# Patient Record
Sex: MALE | Race: WHITE | NOT HISPANIC OR LATINO | Employment: FULL TIME | URBAN - METROPOLITAN AREA
[De-identification: names, ages, dates, MRNs, and addresses within clinical notes are randomized per-mention and may not be internally consistent; named-entity substitution may affect disease eponyms.]

---

## 2017-05-16 ENCOUNTER — GENERIC CONVERSION - ENCOUNTER (OUTPATIENT)
Dept: OTHER | Facility: OTHER | Age: 37
End: 2017-05-16

## 2017-08-07 ENCOUNTER — GENERIC CONVERSION - ENCOUNTER (OUTPATIENT)
Dept: OTHER | Facility: OTHER | Age: 37
End: 2017-08-07

## 2017-08-16 ENCOUNTER — GENERIC CONVERSION - ENCOUNTER (OUTPATIENT)
Dept: OTHER | Facility: OTHER | Age: 37
End: 2017-08-16

## 2017-08-17 ENCOUNTER — TELEPHONE (OUTPATIENT)
Dept: INPATIENT UNIT | Facility: HOSPITAL | Age: 37
End: 2017-08-17

## 2017-08-18 ENCOUNTER — HOSPITAL ENCOUNTER (OUTPATIENT)
Dept: NON INVASIVE DIAGNOSTICS | Facility: HOSPITAL | Age: 37
Discharge: HOME | End: 2017-08-18
Attending: INTERNAL MEDICINE | Admitting: INTERNAL MEDICINE
Payer: COMMERCIAL

## 2017-08-18 VITALS
RESPIRATION RATE: 20 BRPM | OXYGEN SATURATION: 96 % | TEMPERATURE: 97.8 F | HEART RATE: 65 BPM | SYSTOLIC BLOOD PRESSURE: 144 MMHG | DIASTOLIC BLOOD PRESSURE: 75 MMHG | HEIGHT: 75 IN | WEIGHT: 245 LBS | BODY MASS INDEX: 30.46 KG/M2

## 2017-08-18 DIAGNOSIS — R42 DIZZINESS AND GIDDINESS: ICD-10-CM

## 2017-08-18 DIAGNOSIS — R55 SYNCOPE AND COLLAPSE: ICD-10-CM

## 2017-08-18 DIAGNOSIS — R00.2 PALPITATION: ICD-10-CM

## 2017-08-18 PROCEDURE — 33282 HB IMPLANT PAT-ACTIVE HT RECORD: CPT | Performed by: INTERNAL MEDICINE

## 2017-08-18 PROCEDURE — C1764 EVENT RECORDER, CARDIAC: HCPCS

## 2017-08-18 RX ORDER — CANDESARTAN 16 MG/1
16 TABLET ORAL DAILY
COMMUNITY
End: 2017-11-10

## 2017-08-18 RX ORDER — LIDOCAINE HYDROCHLORIDE AND EPINEPHRINE 10; 10 MG/ML; UG/ML
INJECTION, SOLUTION INFILTRATION; PERINEURAL CODE/TRAUMA/SEDATION MEDICATION
Status: COMPLETED | OUTPATIENT
Start: 2017-08-18 | End: 2017-08-18

## 2017-08-18 RX ADMIN — LIDOCAINE HYDROCHLORIDE AND EPINEPHRINE 10 ML: 10; 10 INJECTION, SOLUTION INFILTRATION; PERINEURAL at 12:41

## 2017-11-09 ENCOUNTER — ALLSCRIPTS OFFICE VISIT (OUTPATIENT)
Dept: OTHER | Facility: OTHER | Age: 37
End: 2017-11-09

## 2017-11-09 ENCOUNTER — GENERIC CONVERSION - ENCOUNTER (OUTPATIENT)
Dept: OTHER | Facility: OTHER | Age: 37
End: 2017-11-09

## 2017-11-10 ENCOUNTER — APPOINTMENT (EMERGENCY)
Dept: RADIOLOGY | Facility: HOSPITAL | Age: 37
End: 2017-11-10
Payer: COMMERCIAL

## 2017-11-10 ENCOUNTER — GENERIC CONVERSION - ENCOUNTER (OUTPATIENT)
Dept: OTHER | Facility: OTHER | Age: 37
End: 2017-11-10

## 2017-11-10 ENCOUNTER — HOSPITAL ENCOUNTER (EMERGENCY)
Facility: HOSPITAL | Age: 37
Discharge: HOME | End: 2017-11-10
Attending: EMERGENCY MEDICINE
Payer: COMMERCIAL

## 2017-11-10 VITALS
HEIGHT: 75 IN | HEART RATE: 67 BPM | BODY MASS INDEX: 31.58 KG/M2 | DIASTOLIC BLOOD PRESSURE: 69 MMHG | OXYGEN SATURATION: 97 % | RESPIRATION RATE: 18 BRPM | WEIGHT: 254 LBS | SYSTOLIC BLOOD PRESSURE: 158 MMHG | TEMPERATURE: 96 F

## 2017-11-10 DIAGNOSIS — R00.2 PALPITATIONS: ICD-10-CM

## 2017-11-10 DIAGNOSIS — R07.9 CHEST PAIN: Primary | ICD-10-CM

## 2017-11-10 LAB
ALBUMIN SERPL BCP-MCNC: 3.9 G/DL (ref 3.5–5)
ALP SERPL-CCNC: 80 U/L (ref 46–116)
ALT SERPL W P-5'-P-CCNC: 52 U/L (ref 12–78)
ANION GAP SERPL CALCULATED.3IONS-SCNC: 6 MMOL/L (ref 4–13)
APTT PPP: 24 SECONDS (ref 23–35)
AST SERPL W P-5'-P-CCNC: 35 U/L (ref 5–45)
ATRIAL RATE: 68 BPM
ATRIAL RATE: 72 BPM
BASOPHILS # BLD AUTO: 0.04 THOUSANDS/ΜL (ref 0–0.1)
BASOPHILS NFR BLD AUTO: 1 % (ref 0–1)
BILIRUB SERPL-MCNC: 0.58 MG/DL (ref 0.2–1)
BUN SERPL-MCNC: 10 MG/DL (ref 5–25)
CALCIUM SERPL-MCNC: 8.7 MG/DL (ref 8.3–10.1)
CHLORIDE SERPL-SCNC: 103 MMOL/L (ref 100–108)
CO2 SERPL-SCNC: 27 MMOL/L (ref 21–32)
CREAT SERPL-MCNC: 1.31 MG/DL (ref 0.6–1.3)
EOSINOPHIL # BLD AUTO: 0.16 THOUSAND/ΜL (ref 0–0.61)
EOSINOPHIL NFR BLD AUTO: 2 % (ref 0–6)
ERYTHROCYTE [DISTWIDTH] IN BLOOD BY AUTOMATED COUNT: 12.4 % (ref 11.6–15.1)
GFR SERPL CREATININE-BSD FRML MDRD: 69 ML/MIN/1.73SQ M
GLUCOSE SERPL-MCNC: 97 MG/DL (ref 65–140)
HCT VFR BLD AUTO: 53.5 % (ref 36.5–49.3)
HGB BLD-MCNC: 19.1 G/DL (ref 12–17)
HOLD SPECIMEN: NORMAL
INR PPP: 0.96 (ref 0.86–1.16)
LYMPHOCYTES # BLD AUTO: 1.99 THOUSANDS/ΜL (ref 0.6–4.47)
LYMPHOCYTES NFR BLD AUTO: 26 % (ref 14–44)
MCH RBC QN AUTO: 33.6 PG (ref 26.8–34.3)
MCHC RBC AUTO-ENTMCNC: 35.7 G/DL (ref 31.4–37.4)
MCV RBC AUTO: 94 FL (ref 82–98)
MONOCYTES # BLD AUTO: 0.54 THOUSAND/ΜL (ref 0.17–1.22)
MONOCYTES NFR BLD AUTO: 7 % (ref 4–12)
NEUTROPHILS # BLD AUTO: 4.75 THOUSANDS/ΜL (ref 1.85–7.62)
NEUTS SEG NFR BLD AUTO: 64 % (ref 43–75)
NRBC BLD AUTO-RTO: 0 /100 WBCS
P AXIS: 63 DEGREES
P AXIS: 63 DEGREES
PLATELET # BLD AUTO: 286 THOUSANDS/UL (ref 149–390)
PMV BLD AUTO: 9.8 FL (ref 8.9–12.7)
POTASSIUM SERPL-SCNC: 4 MMOL/L (ref 3.5–5.3)
PR INTERVAL: 134 MS
PR INTERVAL: 148 MS
PROT SERPL-MCNC: 7.1 G/DL (ref 6.4–8.2)
PROTHROMBIN TIME: 12.8 SECONDS (ref 12.1–14.4)
QRS AXIS: 75 DEGREES
QRS AXIS: 90 DEGREES
QRSD INTERVAL: 102 MS
QRSD INTERVAL: 98 MS
QT INTERVAL: 362 MS
QT INTERVAL: 372 MS
QTC INTERVAL: 384 MS
QTC INTERVAL: 407 MS
RBC # BLD AUTO: 5.69 MILLION/UL (ref 3.88–5.62)
SODIUM SERPL-SCNC: 136 MMOL/L (ref 136–145)
SPECIMEN SOURCE: NORMAL
SPECIMEN SOURCE: NORMAL
T WAVE AXIS: 51 DEGREES
T WAVE AXIS: 57 DEGREES
TROPONIN I BLD-MCNC: 0 NG/ML (ref 0–0.08)
TROPONIN I BLD-MCNC: 0 NG/ML (ref 0–0.08)
VENTRICULAR RATE: 68 BPM
VENTRICULAR RATE: 72 BPM
WBC # BLD AUTO: 7.56 THOUSAND/UL (ref 4.31–10.16)

## 2017-11-10 PROCEDURE — 85025 COMPLETE CBC W/AUTO DIFF WBC: CPT | Performed by: EMERGENCY MEDICINE

## 2017-11-10 PROCEDURE — 93005 ELECTROCARDIOGRAM TRACING: CPT | Performed by: EMERGENCY MEDICINE

## 2017-11-10 PROCEDURE — 85610 PROTHROMBIN TIME: CPT | Performed by: EMERGENCY MEDICINE

## 2017-11-10 PROCEDURE — 99285 EMERGENCY DEPT VISIT HI MDM: CPT

## 2017-11-10 PROCEDURE — 80053 COMPREHEN METABOLIC PANEL: CPT | Performed by: EMERGENCY MEDICINE

## 2017-11-10 PROCEDURE — 96361 HYDRATE IV INFUSION ADD-ON: CPT

## 2017-11-10 PROCEDURE — 84484 ASSAY OF TROPONIN QUANT: CPT

## 2017-11-10 PROCEDURE — 71020 HB CHEST X-RAY 2VW FRONTAL&LATL: CPT

## 2017-11-10 PROCEDURE — 36415 COLL VENOUS BLD VENIPUNCTURE: CPT

## 2017-11-10 PROCEDURE — 85730 THROMBOPLASTIN TIME PARTIAL: CPT | Performed by: EMERGENCY MEDICINE

## 2017-11-10 PROCEDURE — 96374 THER/PROPH/DIAG INJ IV PUSH: CPT

## 2017-11-10 RX ORDER — KETOROLAC TROMETHAMINE 30 MG/ML
15 INJECTION, SOLUTION INTRAMUSCULAR; INTRAVENOUS ONCE
Status: COMPLETED | OUTPATIENT
Start: 2017-11-10 | End: 2017-11-10

## 2017-11-10 RX ORDER — CANDESARTAN 8 MG/1
8 TABLET ORAL DAILY
COMMUNITY
End: 2020-08-03 | Stop reason: ALTCHOICE

## 2017-11-10 RX ADMIN — SODIUM CHLORIDE 1000 ML: 0.9 INJECTION, SOLUTION INTRAVENOUS at 15:15

## 2017-11-10 RX ADMIN — KETOROLAC TROMETHAMINE 15 MG: 30 INJECTION, SOLUTION INTRAMUSCULAR at 12:39

## 2017-11-10 NOTE — DISCHARGE INSTRUCTIONS
Follow up with pcp in 1-3 days  If you begin to have fevers, worsening of symptoms, return to the ED immediately  Chest Pain, Ambulatory Care   GENERAL INFORMATION:   Chest pain  can be caused by a range of conditions, from not serious to life-threatening  It may be caused by a heart attack or a blood clot in your lungs  Sometimes chest pain or pressure is caused by poor blood flow to your heart (angina)  Infection, inflammation, or a fracture in the bones or cartilage in your chest can cause pain or discomfort  Chest pain can also be a symptom of a digestive problem, such as acid reflux or a stomach ulcer  Common symptoms include the following:   · Fever or sweating     · Nausea or vomiting     · Shortness of breath     · Discomfort or pressure that spreads from your chest to your back, jaw, or arm     · A racing or slow heartbeat     · Feeling weak, tired, or faint  Seek immediate care for the following symptoms:   · Any of the following signs of a heart attack:      ¨ Squeezing, pressure, or pain in your chest that lasts longer than 5 minutes or returns    ¨ Discomfort or pain in your back, neck, jaw, stomach, or arm     ¨ Trouble breathing     ¨ Nausea or vomiting    ¨ Lightheadedness or a sudden cold sweat, especially with trouble breathing         · Chest discomfort that gets worse, even with medicine    · Coughing or vomiting blood    · Black or bloody bowel movements     · Vomiting that does not stop, or pain when you swallow  Treatment for chest pain  may include medicine to treat your symptoms while he determines the cause of your chest pain  You may also need any of the following:  · Antiplatelets , such as aspirin, help prevent blood clots  Take your antiplatelet medicine exactly as directed  These medicines make it more likely for you to bleed or bruise  If you are told to take aspirin, do not take acetaminophen or ibuprofen instead  · Prescription pain medicine  may be given   Ask how to take this medicine safely  Do not smoke: If you smoke, it is never too late to quit  Smoking increases your risk for a heart attack and other heart and lung conditions  Ask your healthcare provider for information about how to stop smoking if you need help  Follow up with your healthcare provider as directed: You may need more tests  You may be referred to a specialist, such as a cardiologist or gastroenterologist  Write down your questions so you remember to ask them during your visits  CARE AGREEMENT:   You have the right to help plan your care  Learn about your health condition and how it may be treated  Discuss treatment options with your caregivers to decide what care you want to receive  You always have the right to refuse treatment  The above information is an  only  It is not intended as medical advice for individual conditions or treatments  Talk to your doctor, nurse or pharmacist before following any medical regimen to see if it is safe and effective for you  © 2014 [address] is for End User's use only and may not be sold, redistributed or otherwise used for commercial purposes  All illustrations and images included in CareNotes® are the copyrighted property of A D A M , Inc  or [employer] Olvera

## 2017-11-10 NOTE — ED PROVIDER NOTES
History  Chief Complaint   Patient presents with    Chest Pain     Pt c/o chest pain with hx  of SVT  Pt states never had CP before with the SVT, states this is different     40year old male with hx of paroxsymal SVT comes to the ED for evaluation of chest pain that started at around 11:15am  Patient states that he was outside finished arlet, and had palpitations with associated chest pain that radiated to his left arm for a couple of minutes  Patient states he's never had chest pain with the palpitations which is why he came to the hospital  Of note, patient had a negative cath in 2016  Patient also seen by his cardiologist yesterday with plan for ablation on 11/30  Patient states that his chest tightness is better now  Otherwise, patient denies fever, nausea, vomiting, abdominal pain, dysuria, constipation, diarrhea  MDM: I will order troponin, ekg, chest xray, to evaluate for cardiopulmonary pathology  I will give patient toradol as chest pain is tender to palpation so possibly musculalskeltal complonenet to chest pain vs coronary ischemia  Prior to Admission Medications   Prescriptions Last Dose Informant Patient Reported? Taking? candesartan (ATACAND) 8 MG tablet 11/10/2017 at Unknown time  Yes Yes   Sig: Take by mouth      Facility-Administered Medications: None       Past Medical History:   Diagnosis Date    SVT (supraventricular tachycardia) ([facility] [address] )        Past Surgical History:   Procedure Laterality Date    BACK SURGERY      KNEE SURGERY         History reviewed  No pertinent family history  I have reviewed and agree with the history as documented  Social History   Substance Use Topics    Smoking status: Current Every Day Smoker    Smokeless tobacco: Current User     Types: Chew    Alcohol use No        Review of Systems   Constitutional: Negative for appetite change, chills, diaphoresis, fatigue and fever     HENT: Negative for congestion, ear discharge, ear pain, hearing loss, postnasal drip, rhinorrhea, sneezing and sore throat  Eyes: Negative for pain, discharge and redness  Respiratory: Positive for chest tightness and shortness of breath  Negative for cough, choking, wheezing and stridor  Cardiovascular: Positive for chest pain  Negative for palpitations  Gastrointestinal: Negative for abdominal distention, abdominal pain, blood in stool, constipation, diarrhea, nausea and vomiting  Genitourinary: Negative for decreased urine volume, difficulty urinating, dysuria, flank pain, frequency and hematuria  Musculoskeletal: Negative for arthralgias, gait problem, joint swelling and neck pain  Skin: Negative for color change, pallor and rash  Allergic/Immunologic: Negative for environmental allergies, food allergies and immunocompromised state  Neurological: Positive for light-headedness  Negative for dizziness, seizures, weakness, numbness and headaches  Hematological: Negative for adenopathy  Does not bruise/bleed easily  Psychiatric/Behavioral: Negative for agitation and behavioral problems  Physical Exam  ED Triage Vitals   Temperature Pulse Respirations Blood Pressure SpO2   11/10/17 1152 11/10/17 1152 11/10/17 1152 11/10/17 1203 11/10/17 1203   (!) 96 °F (35 6 °C) 86 20 156/84 95 %      Temp Source Heart Rate Source Patient Position - Orthostatic VS BP Location FiO2 (%)   11/10/17 1152 11/10/17 1152 11/10/17 1152 11/10/17 1152 --   Tympanic Monitor Lying Right arm       Pain Score       11/10/17 1152       4           Orthostatic Vital Signs  Vitals:    11/10/17 1340 11/10/17 1409 11/10/17 1513 11/10/17 1622   BP: 157/77 163/74 162/65 158/69   Pulse: 71 73 77 67   Patient Position - Orthostatic VS: Lying Lying Sitting Sitting       Physical Exam   Constitutional: He is oriented to person, place, and time  He appears well-developed and well-nourished  HENT:   Head: Normocephalic and atraumatic     Nose: Nose normal    Mouth/Throat: Oropharynx is clear and moist    Eyes: Conjunctivae and EOM are normal  Pupils are equal, round, and reactive to light  Neck: Normal range of motion  Neck supple  Cardiovascular: Normal rate, regular rhythm and normal heart sounds  Exam reveals no gallop and no friction rub  No murmur heard  Pulmonary/Chest: Effort normal and breath sounds normal  No respiratory distress  He has no wheezes  He has no rales  Abdominal: Soft  Bowel sounds are normal  He exhibits no distension  There is no tenderness  There is no rebound and no guarding  Musculoskeletal: Normal range of motion  Neurological: He is alert and oriented to person, place, and time  Skin: Skin is warm and dry  Psychiatric: He has a normal mood and affect  His behavior is normal    Nursing note and vitals reviewed  ED Medications  Medications   ketorolac (TORADOL) 30 mg/mL injection 15 mg (15 mg Intravenous Given 11/10/17 1239)   sodium chloride 0 9 % bolus 1,000 mL (0 mL Intravenous Stopped 11/10/17 1621)       Diagnostic Studies  Results Reviewed     Procedure Component Value Units Date/Time    POCT troponin [[id number]]  (Normal) Collected:  11/10/17 1519    Lab Status:  Final result Updated:  11/10/17 1532     POC Troponin I 0 00 ng/ml      Specimen Type VENOUS    Narrative:         [employer] i-Stat handheld analyzer 99% cutoff is > 0 08ng/mL in [facility] Emergency Departments    o cTnI 99% cutoff is useful only when applied to patients in the clinical setting of myocardial ischemia  o cTnI 99% cutoff should be interpreted in the context of clinical history, ECG findings and possibly cardiac imaging to establish correct diagnosis  o cTnI 99% cutoff may be suggestive but clearly not indicative of a coronary event without the clinical setting of myocardial ischemia      Comprehensive metabolic panel [[id number]]  (Abnormal) Collected:  11/10/17 1200    Lab Status:  Final result Specimen:  Blood from Arm, Right Updated:  11/10/17 1234     Sodium 136 mmol/L Potassium 4 0 mmol/L      Chloride 103 mmol/L      CO2 27 mmol/L      Anion Gap 6 mmol/L      BUN 10 mg/dL      Creatinine 1 31 (H) mg/dL      Glucose 97 mg/dL      Calcium 8 7 mg/dL      AST 35 U/L      ALT 52 U/L      Alkaline Phosphatase 80 U/L      Total Protein 7 1 g/dL      Albumin 3 9 g/dL      Total Bilirubin 0 58 mg/dL      eGFR 69 ml/min/1 73sq m     Narrative:         National Kidney Disease Education Program recommendations are as follows:  GFR calculation is accurate only with a steady state creatinine  Chronic Kidney disease less than 60 ml/min/1 73 sq  meters  Kidney failure less than 15 ml/min/1 73 sq  meters  Prema Diamond [[id number]]  (Normal) Collected:  11/10/17 1200    Lab Status:  Final result Specimen:  Blood from Arm, Right Updated:  11/10/17 1219     Protime 12 8 seconds      INR 0 96    APTT [[id number]]  (Normal) Collected:  11/10/17 1200    Lab Status:  Final result Specimen:  Blood from Arm, Right Updated:  11/10/17 1219     PTT 24 seconds     Narrative: Therapeutic Heparin Range = 60-90 seconds    POCT troponin [[id number]]  (Normal) Collected:  11/10/17 1204    Lab Status:  Final result Updated:  11/10/17 1217     POC Troponin I 0 00 ng/ml      Specimen Type VENOUS    Narrative:         [employer] i-Stat handheld analyzer 99% cutoff is > 0 08ng/mL in [facility] Emergency Departments    o cTnI 99% cutoff is useful only when applied to patients in the clinical setting of myocardial ischemia  o cTnI 99% cutoff should be interpreted in the context of clinical history, ECG findings and possibly cardiac imaging to establish correct diagnosis  o cTnI 99% cutoff may be suggestive but clearly not indicative of a coronary event without the clinical setting of myocardial ischemia      CBC and differential [[id number]]  (Abnormal) Collected:  11/10/17 1200    Lab Status:  Final result Specimen:  Blood from Arm, Right Updated:  11/10/17 1210     WBC 7 56 Thousand/uL      RBC 5 69 (H) Million/uL Hemoglobin 19 1 (H) g/dL      Hematocrit 53 5 (H) %      MCV 94 fL      MCH 33 6 pg      MCHC 35 7 g/dL      RDW 12 4 %      MPV 9 8 fL      Platelets 732 Thousands/uL      nRBC 0 /100 WBCs      Neutrophils Relative 64 %      Lymphocytes Relative 26 %      Monocytes Relative 7 %      Eosinophils Relative 2 %      Basophils Relative 1 %      Neutrophils Absolute 4 75 Thousands/µL      Lymphocytes Absolute 1 99 Thousands/µL      Monocytes Absolute 0 54 Thousand/µL      Eosinophils Absolute 0 16 Thousand/µL      Basophils Absolute 0 04 Thousands/µL                  X-ray chest 2 views   Final Result by Sourav Peng DO (11/10 1345)      No active pulmonary disease           Workstation performed: [id number]               Procedures  Procedures      Phone Consults  ED Phone Contact    ED Course  ED Course as of Nov 10 1829   Gabrielhilda Rubin Nov 10, 2017   1357 Patient states that his chest pain feels moderately better          HEART Risk Score    Flowsheet Row Most Recent Value   History  1 Filed at: 11/10/2017 1215   ECG  0 Filed at: 11/10/2017 1215   Age  0 Filed at: 11/10/2017 1215   Risk Factors  1 Filed at: 11/10/2017 1215   Troponin  0 Filed at: 11/10/2017 1215   Heart Score Risk Calculator   History  1 Filed at: 11/10/2017 1215   ECG  0 Filed at: 11/10/2017 1215   Age  0 Filed at: 11/10/2017 1215   Risk Factors  1 Filed at: 11/10/2017 1215   Troponin  0 Filed at: 11/10/2017 1215   HEART Score  2 Filed at: 11/10/2017 1215   HEART Score  2 Filed at: 11/10/2017 1215            [address] for PE    Flowsheet Row Most Recent Value   PERC Rule for PE   Age >=50  0 Filed at: 11/10/2017 1216   HR >=100  No data   O2 Sat on room air < 95%  0 Filed at: 11/10/2017 1216   History of PE or DVT  0 Filed at: 11/10/2017 1216   Recent trauma or surgery  0 Filed at: 11/10/2017 1216   Hemoptysis  0 Filed at: 11/10/2017 1216   Exogenous estrogen  0 Filed at: 11/10/2017 1216   Unilateral leg swelling  0 Filed at: 11/10/2017 1216   [address] for PE Results  0 Filed at: 11/10/2017 1216                      MDM  CritCare Time    Disposition  Final diagnoses:   Chest pain   Palpitations     Time reflects when diagnosis was documented in both MDM as applicable and the Disposition within this note     Time User Action Codes Description Comment    11/10/2017  4:17 PM Drenda Beverage Add [R07 9] Chest pain     11/10/2017  4:18 PM Drenda Beverage Add [R00 2] Palpitations       ED Disposition     ED Disposition Condition Comment    Discharge  Prabhu Heard discharge to home/self care  Condition at discharge: Stable        Follow-up Information     Follow up With Specialties Details Why Mortimer Ra, MD  In 3 days  [address] [address]   [phone]          Discharge Medication List as of 11/10/2017  4:20 PM      CONTINUE these medications which have NOT CHANGED    Details   candesartan (ATACAND) 8 MG tablet Take by mouth, Historical Med           No discharge procedures on file  ED Provider  Attending physically available and evaluated Prabhu Heard I managed the patient along with the ED Attending      Electronically Signed by         Luz Mcintyre MD  Resident  11/10/17 9877

## 2017-11-10 NOTE — ED ATTENDING ATTESTATION
Farida Rivera MD, saw and evaluated the patient  I have discussed the patient with the resident/non-physician practitioner and agree with the resident's/non-physician practitioner's findings, Plan of Care, and MDM as documented in the resident's/non-physician practitioner's note, except where noted  All available labs and Radiology studies were reviewed  At this point I agree with the current assessment done in the Emergency Department  I have conducted an independent evaluation of this patient a history and physical is as follows:    CP for 1 hour   Tight    Has SVT history  Has loop recorder  Mild sob no n/v or sweats   No sob now   Had   palpitations   Now symptoms are better       Pt is to have ablation    At the end of November      Cardiac risk factors hypertension,  nonsmoker no family history of early cardiac disease   No hyperlipidemia no cocaine or amphetamine use denies steroid use    The patient had has had a cardiac catheterization in August of 2016 which demonstrated normal coronary arteries   Exam NAD   Neck no jvd  Lungs clear heart rrr  No m  Chest wall mild tenderness noted abd soft nt nd  Pos bs ext normal     Imp CP     EKG normal sinus rhythm no acute ischemic changes   initial troponin 0 00   chest x-ray negative   hemoglobin 19   however he has had elevated hemoglobins on prior CBC   will attempt to have loop recorder interrogated   will perform a a 3 hour troponin and EKG   discussed plan with patient  Critical Care Time  CritCare Time

## 2017-11-29 ENCOUNTER — TELEPHONE (OUTPATIENT)
Dept: INPATIENT UNIT | Facility: HOSPITAL | Age: 37
End: 2017-11-29

## 2017-11-29 DIAGNOSIS — I82.220 ACUTE EMBOLISM AND THROMBOSIS OF INFERIOR VENA CAVA (HCC): ICD-10-CM

## 2017-11-30 ENCOUNTER — HOSPITAL ENCOUNTER (OUTPATIENT)
Dept: NON INVASIVE DIAGNOSTICS | Facility: HOSPITAL | Age: 37
Discharge: HOME | End: 2017-11-30
Attending: INTERNAL MEDICINE | Admitting: INTERNAL MEDICINE
Payer: COMMERCIAL

## 2017-11-30 ENCOUNTER — GENERIC CONVERSION - ENCOUNTER (OUTPATIENT)
Dept: OTHER | Facility: OTHER | Age: 37
End: 2017-11-30

## 2017-11-30 ENCOUNTER — ANESTHESIA EVENT (OUTPATIENT)
Dept: SURGERY | Facility: HOSPITAL | Age: 37
End: 2017-11-30
Payer: COMMERCIAL

## 2017-11-30 ENCOUNTER — ANESTHESIA (OUTPATIENT)
Dept: SURGERY | Facility: HOSPITAL | Age: 37
End: 2017-11-30
Payer: COMMERCIAL

## 2017-11-30 VITALS
TEMPERATURE: 97.4 F | RESPIRATION RATE: 20 BRPM | HEIGHT: 77 IN | DIASTOLIC BLOOD PRESSURE: 57 MMHG | SYSTOLIC BLOOD PRESSURE: 134 MMHG | BODY MASS INDEX: 29.87 KG/M2 | WEIGHT: 253 LBS | OXYGEN SATURATION: 98 % | HEART RATE: 68 BPM

## 2017-11-30 DIAGNOSIS — I47.1 PAROXYSMAL SUPRAVENTRICULAR TACHYCARDIA (HCC): ICD-10-CM

## 2017-11-30 LAB
ANION GAP SERPL CALCULATED.3IONS-SCNC: 7 MMOL/L (ref 4–13)
BASOPHILS # BLD AUTO: 0.02 THOUSANDS/ΜL (ref 0–0.1)
BASOPHILS NFR BLD AUTO: 0 % (ref 0–1)
BUN SERPL-MCNC: 14 MG/DL (ref 5–25)
CALCIUM SERPL-MCNC: 8.6 MG/DL (ref 8.3–10.1)
CHLORIDE SERPL-SCNC: 104 MMOL/L (ref 100–108)
CO2 SERPL-SCNC: 31 MMOL/L (ref 21–32)
CREAT SERPL-MCNC: 1.28 MG/DL (ref 0.6–1.3)
EOSINOPHIL # BLD AUTO: 0.05 THOUSAND/ΜL (ref 0–0.61)
EOSINOPHIL NFR BLD AUTO: 1 % (ref 0–6)
ERYTHROCYTE [DISTWIDTH] IN BLOOD BY AUTOMATED COUNT: 12.2 % (ref 11.6–15.1)
GFR SERPL CREATININE-BSD FRML MDRD: 71 ML/MIN/1.73SQ M
GLUCOSE P FAST SERPL-MCNC: 88 MG/DL (ref 65–99)
GLUCOSE SERPL-MCNC: 88 MG/DL (ref 65–140)
HCT VFR BLD AUTO: 51.6 % (ref 36.5–49.3)
HGB BLD-MCNC: 17.8 G/DL (ref 12–17)
LYMPHOCYTES # BLD AUTO: 1.07 THOUSANDS/ΜL (ref 0.6–4.47)
LYMPHOCYTES NFR BLD AUTO: 16 % (ref 14–44)
MAGNESIUM SERPL-MCNC: 2.5 MG/DL (ref 1.6–2.6)
MCH RBC QN AUTO: 32.4 PG (ref 26.8–34.3)
MCHC RBC AUTO-ENTMCNC: 34.5 G/DL (ref 31.4–37.4)
MCV RBC AUTO: 94 FL (ref 82–98)
MONOCYTES # BLD AUTO: 0.64 THOUSAND/ΜL (ref 0.17–1.22)
MONOCYTES NFR BLD AUTO: 10 % (ref 4–12)
NEUTROPHILS # BLD AUTO: 4.89 THOUSANDS/ΜL (ref 1.85–7.62)
NEUTS SEG NFR BLD AUTO: 73 % (ref 43–75)
NRBC BLD AUTO-RTO: 0 /100 WBCS
PLATELET # BLD AUTO: 276 THOUSANDS/UL (ref 149–390)
PMV BLD AUTO: 9.9 FL (ref 8.9–12.7)
POTASSIUM SERPL-SCNC: 4.3 MMOL/L (ref 3.5–5.3)
RBC # BLD AUTO: 5.49 MILLION/UL (ref 3.88–5.62)
SODIUM SERPL-SCNC: 142 MMOL/L (ref 136–145)
WBC # BLD AUTO: 6.71 THOUSAND/UL (ref 4.31–10.16)

## 2017-11-30 PROCEDURE — 83735 ASSAY OF MAGNESIUM: CPT | Performed by: PHYSICIAN ASSISTANT

## 2017-11-30 PROCEDURE — C1894 INTRO/SHEATH, NON-LASER: HCPCS | Performed by: INTERNAL MEDICINE

## 2017-11-30 PROCEDURE — 85025 COMPLETE CBC W/AUTO DIFF WBC: CPT | Performed by: PHYSICIAN ASSISTANT

## 2017-11-30 PROCEDURE — 93621 COMP EP EVL L PAC&REC C SINS: CPT | Performed by: INTERNAL MEDICINE

## 2017-11-30 PROCEDURE — 93623 PRGRMD STIMJ&PACG IV RX NFS: CPT | Performed by: INTERNAL MEDICINE

## 2017-11-30 PROCEDURE — C1730 CATH, EP, 19 OR FEW ELECT: HCPCS | Performed by: INTERNAL MEDICINE

## 2017-11-30 PROCEDURE — 80048 BASIC METABOLIC PNL TOTAL CA: CPT | Performed by: PHYSICIAN ASSISTANT

## 2017-11-30 PROCEDURE — C1733 CATH, EP, OTHR THAN COOL-TIP: HCPCS | Performed by: INTERNAL MEDICINE

## 2017-11-30 PROCEDURE — 93653 COMPRE EP EVAL TX SVT: CPT | Performed by: INTERNAL MEDICINE

## 2017-11-30 PROCEDURE — C1893 INTRO/SHEATH, FIXED,NON-PEEL: HCPCS | Performed by: INTERNAL MEDICINE

## 2017-11-30 PROCEDURE — 93613 INTRACARDIAC EPHYS 3D MAPG: CPT | Performed by: INTERNAL MEDICINE

## 2017-11-30 RX ORDER — ONDANSETRON 2 MG/ML
4 INJECTION INTRAMUSCULAR; INTRAVENOUS ONCE AS NEEDED
Status: CANCELLED | OUTPATIENT
Start: 2017-11-30

## 2017-11-30 RX ORDER — FENTANYL CITRATE 50 UG/ML
INJECTION, SOLUTION INTRAMUSCULAR; INTRAVENOUS AS NEEDED
Status: DISCONTINUED | OUTPATIENT
Start: 2017-11-30 | End: 2017-11-30 | Stop reason: SURG

## 2017-11-30 RX ORDER — MEPERIDINE HYDROCHLORIDE 25 MG/ML
12.5 INJECTION INTRAMUSCULAR; INTRAVENOUS; SUBCUTANEOUS AS NEEDED
Status: CANCELLED | OUTPATIENT
Start: 2017-11-30

## 2017-11-30 RX ORDER — MIDAZOLAM HYDROCHLORIDE 1 MG/ML
INJECTION INTRAMUSCULAR; INTRAVENOUS AS NEEDED
Status: DISCONTINUED | OUTPATIENT
Start: 2017-11-30 | End: 2017-11-30 | Stop reason: SURG

## 2017-11-30 RX ORDER — PROPOFOL 10 MG/ML
INJECTION, EMULSION INTRAVENOUS CONTINUOUS PRN
Status: DISCONTINUED | OUTPATIENT
Start: 2017-11-30 | End: 2017-11-30 | Stop reason: SURG

## 2017-11-30 RX ORDER — ACETAMINOPHEN 325 MG/1
650 TABLET ORAL EVERY 4 HOURS PRN
Status: DISCONTINUED | OUTPATIENT
Start: 2017-11-30 | End: 2017-11-30 | Stop reason: HOSPADM

## 2017-11-30 RX ORDER — CEFAZOLIN SODIUM 1 G/3ML
INJECTION, POWDER, FOR SOLUTION INTRAMUSCULAR; INTRAVENOUS AS NEEDED
Status: DISCONTINUED | OUTPATIENT
Start: 2017-11-30 | End: 2017-11-30 | Stop reason: SURG

## 2017-11-30 RX ORDER — SODIUM CHLORIDE 9 MG/ML
INJECTION, SOLUTION INTRAVENOUS CONTINUOUS PRN
Status: DISCONTINUED | OUTPATIENT
Start: 2017-11-30 | End: 2017-11-30 | Stop reason: SURG

## 2017-11-30 RX ORDER — ADENOSINE 3 MG/ML
INJECTION INTRAVENOUS CODE/TRAUMA/SEDATION MEDICATION
Status: COMPLETED | OUTPATIENT
Start: 2017-11-30 | End: 2017-11-30

## 2017-11-30 RX ORDER — KETOROLAC TROMETHAMINE 30 MG/ML
INJECTION, SOLUTION INTRAMUSCULAR; INTRAVENOUS AS NEEDED
Status: DISCONTINUED | OUTPATIENT
Start: 2017-11-30 | End: 2017-11-30 | Stop reason: SURG

## 2017-11-30 RX ORDER — LIDOCAINE HYDROCHLORIDE 10 MG/ML
INJECTION, SOLUTION INFILTRATION; PERINEURAL CODE/TRAUMA/SEDATION MEDICATION
Status: COMPLETED | OUTPATIENT
Start: 2017-11-30 | End: 2017-11-30

## 2017-11-30 RX ORDER — ASPIRIN 81 MG/1
81 TABLET ORAL DAILY
Refills: 0
Start: 2017-11-30 | End: 2018-01-09

## 2017-11-30 RX ORDER — ALBUTEROL SULFATE 2.5 MG/3ML
2.5 SOLUTION RESPIRATORY (INHALATION) ONCE AS NEEDED
Status: CANCELLED | OUTPATIENT
Start: 2017-11-30

## 2017-11-30 RX ORDER — ONDANSETRON 2 MG/ML
INJECTION INTRAMUSCULAR; INTRAVENOUS AS NEEDED
Status: DISCONTINUED | OUTPATIENT
Start: 2017-11-30 | End: 2017-11-30 | Stop reason: SURG

## 2017-11-30 RX ORDER — PROPOFOL 10 MG/ML
INJECTION, EMULSION INTRAVENOUS AS NEEDED
Status: DISCONTINUED | OUTPATIENT
Start: 2017-11-30 | End: 2017-11-30 | Stop reason: SURG

## 2017-11-30 RX ORDER — HYDRALAZINE HYDROCHLORIDE 20 MG/ML
INJECTION INTRAMUSCULAR; INTRAVENOUS AS NEEDED
Status: DISCONTINUED | OUTPATIENT
Start: 2017-11-30 | End: 2017-11-30 | Stop reason: SURG

## 2017-11-30 RX ADMIN — HYDRALAZINE HYDROCHLORIDE 10 MG: 20 INJECTION INTRAMUSCULAR; INTRAVENOUS at 12:07

## 2017-11-30 RX ADMIN — CEFAZOLIN 2000 MG: 1 INJECTION, POWDER, FOR SOLUTION INTRAVENOUS at 08:30

## 2017-11-30 RX ADMIN — PROPOFOL 50 MG: 10 INJECTION, EMULSION INTRAVENOUS at 10:27

## 2017-11-30 RX ADMIN — LIDOCAINE HYDROCHLORIDE 5 ML: 10 INJECTION, SOLUTION INFILTRATION; PERINEURAL at 08:40

## 2017-11-30 RX ADMIN — SODIUM CHLORIDE: 0.9 INJECTION, SOLUTION INTRAVENOUS at 08:01

## 2017-11-30 RX ADMIN — PROPOFOL 100 MCG/KG/MIN: 10 INJECTION, EMULSION INTRAVENOUS at 08:07

## 2017-11-30 RX ADMIN — HYDRALAZINE HYDROCHLORIDE 10 MG: 20 INJECTION INTRAMUSCULAR; INTRAVENOUS at 11:51

## 2017-11-30 RX ADMIN — PROPOFOL 50 MG: 10 INJECTION, EMULSION INTRAVENOUS at 08:12

## 2017-11-30 RX ADMIN — PROPOFOL 50 MG: 10 INJECTION, EMULSION INTRAVENOUS at 09:49

## 2017-11-30 RX ADMIN — REMIFENTANIL HYDROCHLORIDE 0.1 MCG/KG/MIN: 1 INJECTION, POWDER, LYOPHILIZED, FOR SOLUTION INTRAVENOUS at 08:07

## 2017-11-30 RX ADMIN — FENTANYL CITRATE 100 MCG: 50 INJECTION, SOLUTION INTRAMUSCULAR; INTRAVENOUS at 12:59

## 2017-11-30 RX ADMIN — ADENOSINE 18 MG: 3 INJECTION, SOLUTION INTRAVENOUS at 09:20

## 2017-11-30 RX ADMIN — KETOROLAC TROMETHAMINE 30 MG: 30 INJECTION, SOLUTION INTRAMUSCULAR at 11:48

## 2017-11-30 RX ADMIN — MIDAZOLAM HYDROCHLORIDE 2 MG: 1 INJECTION, SOLUTION INTRAMUSCULAR; INTRAVENOUS at 08:02

## 2017-11-30 RX ADMIN — ADENOSINE 24 MG: 3 INJECTION, SOLUTION INTRAVENOUS at 09:23

## 2017-11-30 RX ADMIN — ISOPROTERENOL HYDROCHLORIDE 2 MCG/MIN: 0.2 INJECTION, SOLUTION INTRAMUSCULAR; INTRAVENOUS at 09:06

## 2017-11-30 RX ADMIN — ADENOSINE 33 MG: 3 INJECTION, SOLUTION INTRAVENOUS at 09:27

## 2017-11-30 RX ADMIN — ONDANSETRON 4 MG: 2 INJECTION INTRAMUSCULAR; INTRAVENOUS at 09:49

## 2017-11-30 RX ADMIN — MIDAZOLAM HYDROCHLORIDE 1 MG: 1 INJECTION, SOLUTION INTRAMUSCULAR; INTRAVENOUS at 09:15

## 2017-11-30 RX ADMIN — SODIUM CHLORIDE: 0.9 INJECTION, SOLUTION INTRAVENOUS at 11:03

## 2017-11-30 RX ADMIN — MIDAZOLAM HYDROCHLORIDE 1 MG: 1 INJECTION, SOLUTION INTRAMUSCULAR; INTRAVENOUS at 09:22

## 2017-11-30 RX ADMIN — PROPOFOL 40 MG: 10 INJECTION, EMULSION INTRAVENOUS at 11:17

## 2017-11-30 NOTE — ANESTHESIA POSTPROCEDURE EVALUATION
Post-Op Assessment Note      CV Status:  Stable    Mental Status:  Alert and awake    Hydration Status:  Stable    PONV Controlled:  None    Airway Patency:  Patent    Post Op Vitals Reviewed: Yes          Staff: CRNA           BP      Temp     Pulse     Resp     SpO2

## 2017-11-30 NOTE — ANESTHESIA PREPROCEDURE EVALUATION
Review of Systems/Medical History  Patient summary reviewed  Chart reviewed  No history of anesthetic complications     Cardiovascular  Exercise tolerance: good,  Hypertension controlled, Dysrhythmias, history of PSVT,    Pulmonary  Negative pulmonary ROS ,        GI/Hepatic  Negative GI/hepatic ROS          Negative  ROS        Endo/Other  Negative endo/other ROS      GYN       Hematology  Negative hematology ROS      Musculoskeletal  Negative musculoskeletal ROS        Neurology  Negative neurology ROS      Psychology   Negative psychology ROS            Physical Exam    Airway    Mallampati score: I  TM Distance: >3 FB  Neck ROM: full     Dental   No notable dental hx     Cardiovascular  Cardiovascular exam normal    Pulmonary  Pulmonary exam normal     Other Findings        Anesthesia Plan  ASA Score- 2       Anesthesia Type- IV sedation with anesthesia with ASA Monitors  Additional Monitors:   Airway Plan:     Comment: I have seen the patient and reviewed the history  Patient to receive IV sedation with full ASA monitors  Risks discussed with the patient, consent signed          Induction- intravenous  Informed Consent- Anesthetic plan and risks discussed with patient  I personally reviewed this patient with the CRNA  Discussed and agreed on the Anesthesia Plan with the CRNA  Rigoberto Arellano

## 2017-11-30 NOTE — DISCHARGE INSTRUCTIONS
Please take aspirin 81 mg daily for one month following the procedure  No heavy lifting or strenuous activity for one week  No soaking in a bath tub/hot tub/swimming pool for one week or until groin heals  If you notice ongoing bleeding, swelling, or firm lumps in groin near ablation incision, please contact Dr Bess Jimenez office - [phone]

## 2018-01-02 DIAGNOSIS — I82.409 ACUTE EMBOLISM AND THROMBOSIS OF DEEP VEIN OF LOWER EXTREMITY (HCC): ICD-10-CM

## 2018-01-03 ENCOUNTER — ALLSCRIPTS OFFICE VISIT (OUTPATIENT)
Dept: OTHER | Facility: OTHER | Age: 38
End: 2018-01-03

## 2018-01-04 NOTE — PROGRESS NOTES
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  No recurrence since, he has a loop recorder  Prior toa ablation SVT 240bpm, he has had episodes of Heart racing w ear ringing up to 15 minutes and episode of heart racing w syncope in shower  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  had Cariac Cath aug 2016 that was normal  EF normal  Baselien EKG is normal w/o preexciation  superficial femoral DVT after EPS, on xarelto  F/u u/s scheduled  had some bruising in groin but asymptoamtic  HTN well controlled on ARB Planning ulnar release surgery   No recurrent svt, will f/u on loop which can be removed when battery depleted F/u U/s of dvt, if cleared will stop xarelto Will clear for ulnar surgery after f/u for DVT resulted so we can determine need for blood thinner        Chief Complaint   Chief Complaint Free Text Note Form: f/u svt ablation      History of Present Illness   Cardiology HPI Free Text Note Form [facility]: 39 yo male 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  No recurrence since, he has a loop recorder  Prior toa ablation SVT 240bpm, he has had episodes of Heart racing w ear ringing up to 15 minutes and episode of heart racing w syncope in shower  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  had Cariac Cath aug 2016 that was normal  EF normal  Baselien EKG is normal w/o preexciation  superficial femoral DVT after EPS, on xarelto   F/u u/s scheduled  had some bruising in groin but asymptoamtic  HTN well controlled on ARB Planning ulnar release surgery   pont ROS per paper chart      Review of Systems   ROS Reviewed:    ROS reviewed  Active Problems   Problems    1  Acute deep vein thrombosis (DVT) of inferior vena cava (453 2) (I82 220)   2  Dizziness (780 4) (R42)   3  DVT (deep venous thrombosis) (453 40) (I82 409)   4  Paroxysmal SVT (supraventricular tachycardia) (427 0) (I47 1)   5  Vasovagal syncope (780 2) (R55)    Past Medical History   Problems    1  History of Herniated cervical disc without myelopathy (722 0) (M50 20)   2  History of chest pain (V13 89) (Z87 898)   3  History of neck pain (V13 59) (Z87 39)  Active Problems And Past Medical History Reviewed: The active problems and past medical history were reviewed and updated today  Surgical History   Problems    1  History of Knee Surgery   2  History of Shoulder Surgery  Surgical History Reviewed: The surgical history was reviewed and updated today  Family History   Mother    1  Family history of multiple sclerosis (V17 2) (Z82 0)  Father    2  Family history of hypertension (V17 49) (Z82 49)  Family History Reviewed: The family history was reviewed and updated today  Social History   Problems    · Never a smoker  Social History Reviewed: The social history was reviewed and updated today  Current Meds    1  Candesartan Cilexetil 8 MG Oral Tablet; TAKE 1 TABLET Bedtime; Therapy: (Recorded:32Ibs0625) to Recorded   2  Pradaxa 150 MG Oral Capsule; Take 1 capsule twice daily; Therapy: [id number] to (Tyler Ford)  Requested for: [id number]; Last     Rx:[id number] Ordered  Medication List Reviewed: The medication list was reviewed and updated today  Allergies   Medication    1   No Known Drug Allergies    Vitals   Vital Signs    Recorded: 41FJD6499 10:18AM   Heart Rate 79   Systolic 629, LUE, Sitting   Diastolic 88, LUE, Sitting   BP CUFF SIZE Large   Height 6 ft 5 in   Weight 261 lb 8 oz   BMI Calculated 31 01   BSA Calculated 2 51     Physical Exam        Constitutional - General appearance: No acute distress, well appearing and well nourished  -- very muscular  Eyes - Conjunctiva and Sclera examination: Conjunctiva pink, sclera anicteric  Neck - Normal, no JVD   Pulmonary - Respiratory effort: No signs of respiratory distress  -- Auscultation of lungs: Clear to auscultation  Cardiovascular - Auscultation of heart: Normal rate and rhythm, normal S1 and S2, no murmurs  -- Pedal pulses: Normal, 2+ bilaterally  -- Examination of extremities for edema and/or varicosities: Normal        Abdomen - Soft  Musculoskeletal - Gait and station: Normal gait  Skin - Skin: Normal without rashes  Skin is warm and well perfused  Neurologic - Speech normal  No focal deficits  Psychiatric - Orientation to person, place, and time: Normal       Additional Findings - groin w/o hematoma, mild bruising clearing  Results/Data   Diagnostic Studies Reviewed Cardio: I personally reviewed the recording/images in the office today  My interpretation follows        ICD/ Pacer Interpretation Reviewed loop tracings and interpreted, see discussion    ECG Report: NSR, Normal EKG reviewed AQug 2016      Signatures    Electronically signed by : CLAU Melton ; Bony  3 2018 10:37AM EST                       (Author)

## 2018-01-09 ENCOUNTER — HOSPITAL ENCOUNTER (EMERGENCY)
Facility: HOSPITAL | Age: 38
Discharge: HOME | End: 2018-01-09
Attending: EMERGENCY MEDICINE | Admitting: EMERGENCY MEDICINE
Payer: COMMERCIAL

## 2018-01-09 ENCOUNTER — GENERIC CONVERSION - ENCOUNTER (OUTPATIENT)
Dept: OTHER | Facility: OTHER | Age: 38
End: 2018-01-09

## 2018-01-09 ENCOUNTER — APPOINTMENT (EMERGENCY)
Dept: CT IMAGING | Facility: HOSPITAL | Age: 38
End: 2018-01-09
Payer: COMMERCIAL

## 2018-01-09 VITALS
HEART RATE: 66 BPM | WEIGHT: 261.47 LBS | SYSTOLIC BLOOD PRESSURE: 123 MMHG | BODY MASS INDEX: 31.01 KG/M2 | RESPIRATION RATE: 18 BRPM | TEMPERATURE: 97.8 F | OXYGEN SATURATION: 96 % | DIASTOLIC BLOOD PRESSURE: 58 MMHG

## 2018-01-09 DIAGNOSIS — R07.81 PLEURITIC PAIN: ICD-10-CM

## 2018-01-09 DIAGNOSIS — M62.830 SPASM OF THORACIC BACK MUSCLE: Primary | ICD-10-CM

## 2018-01-09 LAB
ANION GAP SERPL CALCULATED.3IONS-SCNC: 4 MMOL/L (ref 4–13)
APTT PPP: 25 SECONDS (ref 23–35)
BASOPHILS # BLD AUTO: 0.05 THOUSANDS/ΜL (ref 0–0.1)
BASOPHILS NFR BLD AUTO: 1 % (ref 0–1)
BUN SERPL-MCNC: 16 MG/DL (ref 5–25)
CALCIUM SERPL-MCNC: 9.1 MG/DL (ref 8.3–10.1)
CHLORIDE SERPL-SCNC: 103 MMOL/L (ref 100–108)
CO2 SERPL-SCNC: 28 MMOL/L (ref 21–32)
CREAT SERPL-MCNC: 1.09 MG/DL (ref 0.6–1.3)
EOSINOPHIL # BLD AUTO: 0.13 THOUSAND/ΜL (ref 0–0.61)
EOSINOPHIL NFR BLD AUTO: 2 % (ref 0–6)
ERYTHROCYTE [DISTWIDTH] IN BLOOD BY AUTOMATED COUNT: 12.7 % (ref 11.6–15.1)
GFR SERPL CREATININE-BSD FRML MDRD: 86 ML/MIN/1.73SQ M
GLUCOSE SERPL-MCNC: 85 MG/DL (ref 65–140)
HCT VFR BLD AUTO: 53 % (ref 36.5–49.3)
HGB BLD-MCNC: 18 G/DL (ref 12–17)
INR PPP: 0.87 (ref 0.86–1.16)
LYMPHOCYTES # BLD AUTO: 1.62 THOUSANDS/ΜL (ref 0.6–4.47)
LYMPHOCYTES NFR BLD AUTO: 20 % (ref 14–44)
MCH RBC QN AUTO: 31.7 PG (ref 26.8–34.3)
MCHC RBC AUTO-ENTMCNC: 34 G/DL (ref 31.4–37.4)
MCV RBC AUTO: 93 FL (ref 82–98)
MONOCYTES # BLD AUTO: 0.78 THOUSAND/ΜL (ref 0.17–1.22)
MONOCYTES NFR BLD AUTO: 10 % (ref 4–12)
NEUTROPHILS # BLD AUTO: 5.58 THOUSANDS/ΜL (ref 1.85–7.62)
NEUTS SEG NFR BLD AUTO: 67 % (ref 43–75)
PLATELET # BLD AUTO: 291 THOUSANDS/UL (ref 149–390)
PMV BLD AUTO: 9.8 FL (ref 8.9–12.7)
POTASSIUM SERPL-SCNC: 5.3 MMOL/L (ref 3.5–5.3)
PROTHROMBIN TIME: 12.1 SECONDS (ref 12.1–14.4)
RBC # BLD AUTO: 5.68 MILLION/UL (ref 3.88–5.62)
SODIUM SERPL-SCNC: 135 MMOL/L (ref 136–145)
WBC # BLD AUTO: 8.16 THOUSAND/UL (ref 4.31–10.16)

## 2018-01-09 PROCEDURE — 96374 THER/PROPH/DIAG INJ IV PUSH: CPT

## 2018-01-09 PROCEDURE — 99284 EMERGENCY DEPT VISIT MOD MDM: CPT

## 2018-01-09 PROCEDURE — 96361 HYDRATE IV INFUSION ADD-ON: CPT

## 2018-01-09 PROCEDURE — 85610 PROTHROMBIN TIME: CPT | Performed by: EMERGENCY MEDICINE

## 2018-01-09 PROCEDURE — 85730 THROMBOPLASTIN TIME PARTIAL: CPT | Performed by: EMERGENCY MEDICINE

## 2018-01-09 PROCEDURE — 85025 COMPLETE CBC W/AUTO DIFF WBC: CPT | Performed by: EMERGENCY MEDICINE

## 2018-01-09 PROCEDURE — 71275 CT ANGIOGRAPHY CHEST: CPT

## 2018-01-09 PROCEDURE — 36415 COLL VENOUS BLD VENIPUNCTURE: CPT | Performed by: EMERGENCY MEDICINE

## 2018-01-09 PROCEDURE — 96375 TX/PRO/DX INJ NEW DRUG ADDON: CPT

## 2018-01-09 PROCEDURE — 80048 BASIC METABOLIC PNL TOTAL CA: CPT | Performed by: EMERGENCY MEDICINE

## 2018-01-09 RX ORDER — DIAZEPAM 5 MG/1
5 TABLET ORAL EVERY 8 HOURS PRN
Qty: 15 TABLET | Refills: 0 | Status: SHIPPED | OUTPATIENT
Start: 2018-01-09 | End: 2020-08-03 | Stop reason: ALTCHOICE

## 2018-01-09 RX ORDER — DIAZEPAM 5 MG/ML
5 INJECTION, SOLUTION INTRAMUSCULAR; INTRAVENOUS ONCE
Status: COMPLETED | OUTPATIENT
Start: 2018-01-09 | End: 2018-01-09

## 2018-01-09 RX ORDER — KETOROLAC TROMETHAMINE 30 MG/ML
15 INJECTION, SOLUTION INTRAMUSCULAR; INTRAVENOUS ONCE
Status: COMPLETED | OUTPATIENT
Start: 2018-01-09 | End: 2018-01-09

## 2018-01-09 RX ADMIN — IOHEXOL 85 ML: 350 INJECTION, SOLUTION INTRAVENOUS at 08:25

## 2018-01-09 RX ADMIN — DIAZEPAM 5 MG: 5 INJECTION, SOLUTION INTRAMUSCULAR; INTRAVENOUS at 07:45

## 2018-01-09 RX ADMIN — SODIUM CHLORIDE 1000 ML: 0.9 INJECTION, SOLUTION INTRAVENOUS at 07:38

## 2018-01-09 RX ADMIN — KETOROLAC TROMETHAMINE 15 MG: 30 INJECTION, SOLUTION INTRAMUSCULAR at 07:49

## 2018-01-09 NOTE — DISCHARGE INSTRUCTIONS
Thoracic Back Strain   WHAT YOU NEED TO KNOW:   A thoracic back strain is a muscle or tendon injury in your upper or middle back  You may have pain, muscle spasms, swelling, or stiffness  The strain may be caused by a back injury, poor posture, or lifting a heavy object  Too much activity can also cause a strain  A mild strain may cause minor pain that goes away in a few days  A more severe strain may cause the muscle or tendon to tear  There is a very small chance you may need surgery to fix the tear  DISCHARGE INSTRUCTIONS:   Medicines: You may need any of the following:  · Prescription pain medicine  may be given  Ask how to take this medicine safely  Do not wait until the pain is severe to take your medicine  · NSAIDs , such as ibuprofen, help decrease swelling, pain, and fever  This medicine is available with or without a doctor's order  NSAIDs can cause stomach bleeding or kidney problems in certain people  If you take blood thinner medicine, always ask your healthcare provider if NSAIDs are safe for you  Always read the medicine label and follow directions  · Muscle relaxers  help prevent or treat spasms  · Take your medicine as directed  Contact your healthcare provider if you think your medicine is not helping or if you have side effects  Tell him or her if you are allergic to any medicine  Keep a list of the medicines, vitamins, and herbs you take  Include the amounts, and when and why you take them  Bring the list or the pill bottles to follow-up visits  Carry your medicine list with you in case of an emergency  Call 911 for any of the following:   · You have chest pain or shortness of breath  Return to the emergency department if:   · You have severe pain, or pain that spreads from your back to other areas  · You have new or increased swelling or redness in the injured area    Contact your healthcare provider if:   · You have questions or concerns about your condition or care     Follow up with your healthcare provider as directed: You may need more tests to check for other injuries or to see how your injury is healing  You may also need to see a specialist  Write down your questions so you remember to ask them during your visits  Self-care:   · Rest as directed  Move slowly and carefully  Do not lift heavy objects  · Apply ice or heat as directed  Ice decreases pain and swelling and may help decrease tissue damage  Heat helps decrease muscle spasms  Your healthcare provider may tell you to apply only ice for the first 24 hours to help reduce swelling  Apply ice or heat to the area for 20 minutes every hour, or as directed  Ask how many times to do this each day, and for how many days  · Use an elastic wrap or back brace as directed  These will help keep the injured area from moving so it can heal      · Go to physical therapy as directed  A physical therapist can teach you exercises to help strengthen your back  They can also teach you safe ways to bend and move so you do not cause more injury  Prevent another thoracic back strain:   · Lift objects carefully  Ask someone to help you lift heavy objects  If you must lift an object by yourself, do not use your back muscles to lift  Lift with your legs  · Check your posture  Keep your upper body lifted and your head up  Poor posture can cause back strain or make it worse  Adjust your position if you work in front of a computer  You may need arm or wrist supports or change the height of your chair  · Exercise as directed  Exercise can help strengthen your muscles and make you more flexible  Do not exercise or play sports when you are tired  Always warm up before you start and cool down when you finish  · Stretch your muscles as directed  Keep your muscles limber by stretching every day  Stretch before you exercise    © 2017 [employer] Information is for End User's use only and may not be sold, redistributed or otherwise used for commercial purposes  All illustrations and images included in CareNotes® are the copyrighted property of [employer]  or [address]  The above information is an  only  It is not intended as medical advice for individual conditions or treatments  Talk to your doctor, nurse or pharmacist before following any medical regimen to see if it is safe and effective for you

## 2018-01-09 NOTE — ED PROVIDER NOTES
History  Chief Complaint   Patient presents with    Back Pain     Pt  c/o left upper back pain that started last night, sharp pains  Pt  was on pradexa, stopped 2 days ago per cardiologist for DVT     80-year-old male presents emergency department for evaluation of sharp left-sided upper back pain  Patient states the pain started to bother him last night before bed  It was mild to moderate at that time but intermittent  Patient was waking up in the middle of the night with sharp increased pains in this region  Patient was concerned because he recently stopped pradaxq  He had a left inguinal DVT 1 month ago after having a cardiac ablation procedure  He had an ultrasound last week demonstrating resolution of thrombosis  Patient denies feeling short of breath but he does have pleuritic pain when he takes a deep breath  No fevers or chills  No hemoptysis  No palpitations or recurrence of dysrhythmia  No lower extremity pain or swelling  No syncope  History provided by:  Patient and medical records   used: No    Back Pain   Location:  Thoracic spine  Quality:  Stabbing (sharp)  Radiates to:  Does not radiate  Pain severity:  Severe  Pain is:  Same all the time  Onset quality:  Gradual  Timing:  Intermittent  Progression:  Worsening  Chronicity:  New  Context: not lifting heavy objects, not recent illness and not recent injury    Relieved by:  Nothing  Worsened by:  Deep breathing, movement and twisting  Ineffective treatments:  None tried  Associated symptoms: no abdominal pain, no chest pain, no fever, no leg pain, no numbness and no weakness    Risk factors: no recent surgery        Prior to Admission Medications   Prescriptions Last Dose Informant Patient Reported? Taking?    candesartan (ATACAND) 8 MG tablet 1/8/2018 at 2000 Self Yes Yes   Sig: Take 8 mg by mouth daily        Facility-Administered Medications: None       Past Medical History:   Diagnosis Date    DVT (deep venous thrombosis) (HCC)     left     SVT (supraventricular tachycardia) ([address] )        Past Surgical History:   Procedure Laterality Date    ABLATION OF DYSRHYTHMIC FOCUS      BACK SURGERY      KNEE SURGERY         History reviewed  No pertinent family history  I have reviewed and agree with the history as documented  Social History   Substance Use Topics    Smoking status: Former Smoker    Smokeless tobacco: Current User     Types: Chew    Alcohol use No        Review of Systems   Constitutional: Negative for fever  Cardiovascular: Negative for chest pain  Gastrointestinal: Negative for abdominal pain, diarrhea, nausea and vomiting  Musculoskeletal: Positive for arthralgias (Right elbow) and back pain  Negative for myalgias  Skin: Negative for rash  Neurological: Negative for weakness and numbness  All other systems reviewed and are negative  Physical Exam  ED Triage Vitals [01/09/18 0653]   Temperature Pulse Respirations Blood Pressure SpO2   97 8 °F (36 6 °C) 62 18 143/73 96 %      Temp Source Heart Rate Source Patient Position - Orthostatic VS BP Location FiO2 (%)   Oral Monitor Lying Right arm --      Pain Score       Worst Possible Pain           Orthostatic Vital Signs  Vitals:    01/09/18 0653 01/09/18 0815 01/09/18 0830   BP: 143/73 123/58    Pulse: 62 60 66   Patient Position - Orthostatic VS: Lying Lying        Physical Exam   Constitutional: He is oriented to person, place, and time  Vital signs are normal  He appears well-developed and well-nourished  HENT:   Head: Normocephalic and atraumatic  Eyes: Conjunctivae and EOM are normal  Pupils are equal, round, and reactive to light  Neck: Normal range of motion  Neck supple  Cardiovascular: Normal rate, regular rhythm, normal heart sounds and intact distal pulses  No murmur heard  Left chest wall loop recorder   Pulmonary/Chest: Effort normal and breath sounds normal  No accessory muscle usage  No respiratory distress   He exhibits no tenderness  Abdominal: Soft  Normal appearance and bowel sounds are normal  He exhibits no distension  There is no tenderness  There is no rebound and no guarding  Musculoskeletal: He exhibits no edema, tenderness or deformity  Thoracic back: He exhibits decreased range of motion and spasm  He exhibits no bony tenderness  Back:    There is tenderness and palpable spasm on the left paraspinal thoracic muscles  Pain is triggered by rotation to the right and deep inspiration   Lymphadenopathy:     He has no cervical adenopathy  Neurological: He is alert and oriented to person, place, and time  He has normal strength and normal reflexes  Coordination normal    Skin: Skin is warm, dry and intact  No rash noted  Multiple tattoos   Psychiatric: He has a normal mood and affect  His behavior is normal  Judgment and thought content normal    Nursing note and vitals reviewed        ED Medications  Medications   sodium chloride 0 9 % bolus 1,000 mL (0 mL Intravenous Stopped 1/9/18 0929)   ketorolac (TORADOL) injection 15 mg (15 mg Intravenous Given 1/9/18 0749)   diazepam (VALIUM) injection 5 mg (5 mg Intravenous Given 1/9/18 0745)   iohexol (OMNIPAQUE) 350 MG/ML injection (MULTI-DOSE) 100 mL (85 mL Intravenous Given 1/9/18 0825)       Diagnostic Studies  Results Reviewed     Procedure Component Value Units Date/Time    Basic metabolic panel [[id number]]  (Abnormal) Collected:  01/09/18 0738    Lab Status:  Final result Specimen:  Blood from Arm, Right Updated:  01/09/18 6721     Sodium 135 (L) mmol/L      Potassium 5 3 mmol/L      Chloride 103 mmol/L      CO2 28 mmol/L      Anion Gap 4 mmol/L      BUN 16 mg/dL      Creatinine 1 09 mg/dL      Glucose 85 mg/dL      Calcium 9 1 mg/dL      eGFR 86 ml/min/1 73sq m     Narrative:         National Kidney Disease Education Program recommendations are as follows:  GFR calculation is accurate only with a steady state creatinine  Chronic Kidney disease less than 60 ml/min/1 73 sq  meters  Kidney failure less than 15 ml/min/1 73 sq  meters  Noemí Santiago [[id number]]  (Normal) Collected:  01/09/18 0738    Lab Status:  Final result Specimen:  Blood from Arm, Right Updated:  01/09/18 0755     Protime 12 1 seconds      INR 0 87    APTT [[id number]]  (Normal) Collected:  01/09/18 0738    Lab Status:  Final result Specimen:  Blood from Arm, Right Updated:  01/09/18 0755     PTT 25 seconds     Narrative: Therapeutic Heparin Range = 60-90 seconds    CBC and differential [[id number]]  (Abnormal) Collected:  01/09/18 0738    Lab Status:  Final result Specimen:  Blood from Arm, Right Updated:  01/09/18 0744     WBC 8 16 Thousand/uL      RBC 5 68 (H) Million/uL      Hemoglobin 18 0 (H) g/dL      Hematocrit 53 0 (H) %      MCV 93 fL      MCH 31 7 pg      MCHC 34 0 g/dL      RDW 12 7 %      MPV 9 8 fL      Platelets 484 Thousands/uL      Neutrophils Relative 67 %      Lymphocytes Relative 20 %      Monocytes Relative 10 %      Eosinophils Relative 2 %      Basophils Relative 1 %      Neutrophils Absolute 5 58 Thousands/µL      Lymphocytes Absolute 1 62 Thousands/µL      Monocytes Absolute 0 78 Thousand/µL      Eosinophils Absolute 0 13 Thousand/µL      Basophils Absolute 0 05 Thousands/µL                  CTA ED chest PE study   Final Result by Ponce Millan DO (01/09 3610)      No evidence of pulmonary embolus within limitations  Moderate bilateral gynecomastia                    Workstation performed: [id number]                    Procedures  Procedures       Phone Contacts  ED Phone Contact    ED Course  ED Course                                MDM  Number of Diagnoses or Management Options  Pleuritic pain: new and requires workup  Spasm of thoracic back muscle: new and requires workup     Amount and/or Complexity of Data Reviewed  Clinical lab tests: ordered and reviewed  Tests in the radiology section of CPT®: ordered and reviewed  Decide to obtain previous medical records or to obtain history from someone other than the patient: yes  Independent visualization of images, tracings, or specimens: yes    Risk of Complications, Morbidity, and/or Mortality  General comments: 49-year-old male with acute left upper thoracic back pain differential diagnosis includes but is not limited to acute thoracic strain, muscle spasm, PE, pneumothorax, pneumonia  Workup in the department is unremarkable CT scan is negative for PE  Suspect symptoms are musculoskeletal musculoskeletal in etiology  Will treat with muscle relaxers  Patient was instructed to avoid NSAIDs due to history of SVT  Discussed signs and symptoms to return to the emergency department  Patient Progress  Patient progress: stable    CritCare Time    Disposition  Final diagnoses:   Spasm of thoracic back muscle   Pleuritic pain     Time reflects when diagnosis was documented in both MDM as applicable and the Disposition within this note     Time User Action Codes Description Comment    1/9/2018  9:07 AM David Rhoades Add [M62 830] Spasm of thoracic back muscle     1/9/2018  9:07 AM Serena Umanzor Add [R07 81] Pleuritic pain       ED Disposition     ED Disposition Condition Comment    Discharge  Romana Posadas discharge to home/self care      Condition at discharge: Stable        Follow-up Information     Follow up With Specialties Details Why Contact Info    Tarah Hough MD  Schedule an appointment as soon as possible for a visit in 2 days As needed, If symptoms worsen [address] [place] [address]   [phone]          Discharge Medication List as of 1/9/2018  9:22 AM      START taking these medications    Details   diazepam (VALIUM) 5 mg tablet Take 1 tablet by mouth every 8 (eight) hours as needed for muscle spasms for up to 10 days, Starting Tue 1/9/2018, Until Fri 1/19/2018, Print         CONTINUE these medications which have NOT CHANGED    Details   candesartan (ATACAND) 8 MG tablet Take 8 mg by mouth daily , Historical Med           No discharge procedures on file      ED Provider  Electronically Signed by           Sahra Romero,   01/09/18 7915

## 2018-01-12 NOTE — PROGRESS NOTES
Surgery Scheduling Form   Pre-Operative Risk Assessment:      Date Last Seen: 1/3/18      Date of Surgery: 1/24/18      Hospital: [facility]      Type of Surgery: Ulnar Release Surgery      Surgeon Name: Dr Miguel Rice Number: [phone]  Fax Number: [phone]  Pulmonary: No Pulmonary Contraindication for planned surgical procedure      Cardiac: No Cardiac Contraindication for planned surgical procedure      Vascular: No Vascular Contraindication for planned surgical procedure      Further Testing Required: No      Anticoagulation: Yes, Hold to stop 4 days prior to surgery, does not need to be restarted  Anesthesia: Choice      Patient Approved for Surgery: Yes      Signatures    Electronically signed by :  Mal Maxwell, ; Jan 9 2018  9:57AM EST                       (Author)     Electronically signed by : CLAU Hussein ; Bony 10 2018  3:42PM EST                       (Author)

## 2018-01-22 VITALS
HEIGHT: 77 IN | WEIGHT: 253.06 LBS | SYSTOLIC BLOOD PRESSURE: 130 MMHG | DIASTOLIC BLOOD PRESSURE: 82 MMHG | HEART RATE: 83 BPM | BODY MASS INDEX: 29.88 KG/M2

## 2018-01-22 VITALS
DIASTOLIC BLOOD PRESSURE: 88 MMHG | HEIGHT: 77 IN | HEART RATE: 79 BPM | BODY MASS INDEX: 30.88 KG/M2 | SYSTOLIC BLOOD PRESSURE: 122 MMHG | WEIGHT: 261.5 LBS

## 2020-06-30 ENCOUNTER — TELEPHONE (OUTPATIENT)
Dept: OTHER | Facility: OTHER | Age: 40
End: 2020-06-30

## 2020-08-03 ENCOUNTER — OFFICE VISIT (OUTPATIENT)
Dept: CARDIOLOGY CLINIC | Facility: CLINIC | Age: 40
End: 2020-08-03
Payer: COMMERCIAL

## 2020-08-03 VITALS
HEART RATE: 83 BPM | BODY MASS INDEX: 32.26 KG/M2 | HEIGHT: 76 IN | DIASTOLIC BLOOD PRESSURE: 84 MMHG | WEIGHT: 264.9 LBS | SYSTOLIC BLOOD PRESSURE: 128 MMHG

## 2020-08-03 DIAGNOSIS — I47.1 SUPRAVENTRICULAR TACHYCARDIA (HCC): Primary | ICD-10-CM

## 2020-08-03 PROCEDURE — 99213 OFFICE O/P EST LOW 20 MIN: CPT | Performed by: INTERNAL MEDICINE

## 2020-08-03 PROCEDURE — 93000 ELECTROCARDIOGRAM COMPLETE: CPT | Performed by: INTERNAL MEDICINE

## 2020-08-03 NOTE — H&P (VIEW-ONLY)
HEART AND VASCULAR  CARDIAC [address]    Outpatient Follow-up  Today's Date: 08/03/20        Patient name: Viky Arnold  YOB: 1980  Sex: male         Chief Complaint: f/u svt      ASSESSMENT:  Problem List Items Addressed This Visit     None      Visit Diagnoses     Supraventricular tachycardia [facility])    -  Primary    Relevant Orders    POCT ECG        37 yo male  1) Atypcal AVNRT ablation       ) 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  No recurrence since, he has a loop recorder  Prior toa ablation SVT 240bpm, he has had episodes of Heart racing w ear ringing up to 15 minutes and episode of heart racing w syncope in shower  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  had Cariac Cath aug 2016 that was normal  EF normal  Baselien EKG is normal w/o preexciation  Loop is out of batteris since June  PLAN:  Loop recorder removal      Orders Placed This Encounter   Procedures    POCT ECG     Medications Discontinued During This Encounter   Medication Reason    candesartan (ATACAND) 8 MG tablet Therapy completed    diazepam (VALIUM) 5 mg tablet Therapy completed             HPI/Subjective:   Jory Cervantes doing well  Feels good  No CP/SOB, no palpitations  He is feeling good  NO recurrence since ablation  Please note HPI is listed by problem with with update following it, it is copied again in the assessment above and reflects medical decision making as well  Complete 12 point ROS reviewed and otherwise non pertinent or negative except as per HPI pertinent positives in Cardiovascular and Respiratory emphasized  Please see paper chart for outpatient clinic patients where the patient completed the 12 point ROS survey             Past Medical History:   Diagnosis Date    DVT (deep venous thrombosis) (HCC)     left     SVT (supraventricular tachycardia) (HCC)        No Known Allergies  I reviewed the Home Medication list and Allergies in the chart  Scheduled Meds:  No current outpatient medications on file  No current facility-administered medications for this visit  PRN Meds:  No family history on file      Social History     Socioeconomic History    Marital status:      Spouse name: Not on file    Number of children: Not on file    Years of education: Not on file    Highest education level: Not on file   Occupational History    Not on file   Social Needs    Financial resource strain: Not on file    Food insecurity     Worry: Not on file     Inability: Not on file    Transportation needs     Medical: Not on file     Non-medical: Not on file   Tobacco Use    Smoking status: Never Smoker    Smokeless tobacco: Former User     Types: Chew   Substance and Sexual Activity    Alcohol use: No    Drug use: Never    Sexual activity: Not on file   Lifestyle    Physical activity     Days per week: Not on file     Minutes per session: Not on file    Stress: Not on file   Relationships    Social connections     Talks on phone: Not on file     Gets together: Not on file     Attends Protestant service: Not on file     Active member of club or organization: Not on file     Attends meetings of clubs or organizations: Not on file     Relationship status: Not on file    Intimate partner violence     Fear of current or ex partner: Not on file     Emotionally abused: Not on file     Physically abused: Not on file     Forced sexual activity: Not on file   Other Topics Concern    Not on file   Social History Narrative    Not on file         OBJECTIVE:    /84 (BP Location: Left arm, Patient Position: Sitting, Cuff Size: Large)   Pulse 83   Ht 6' 4"   Wt 120 kg (264 lb 14 4 oz)   BMI 32 24 kg/m²   Vitals:    08/03/20 1635   Weight: 120 kg (264 lb 14 4 oz)     GEN: No acute distress, Alert and oriented, well appearing  HEENT:Head, neck, ears, oral pharynx: Mucus membranes moist, oral pharynx clear, nares clear  External ears normal  EYES: Pupils equal, sclera anicteric, midline, normal conjuctiva  NECK: No JVD, supple, no obvious masses or thryomegaly or goiter  CARDIOVASCULAR:  RRR, No murmur, rub, gallops S1,S2  LUNGS: Clear To auscultation bilaterally, normal effort, no rales, rhonchi, crackles  ABDOMEN:  nondistended,  without obvious organomegaly or ascites  EXTREMITIES/VASCULAR:  No edema  Radial pulses intact, pedal pulses difficult to palpate, warm an well perfused  PSYCH: Normal Affect, no overt suicidal ideation, linear speech pattern without evidence of psychosis  NEURO: Grossly intact, moving all extremiteis equal, face symmetric, alert and responsive, no obvious focal defecits  GAIT:  Ambulates normally without difficulty  HEME: No bleeding, bruising, petechia, purpura  SKIN: No significant rashes, warm, no diaphoresis or pallor  Lab Results:       LABS:      Chemistry        Component Value Date/Time     (L) 06/24/2014 2001    K 5 3 01/09/2018 0738    K 3 6 06/24/2014 2001     01/09/2018 0738     06/24/2014 2001    CO2 28 01/09/2018 0738    CO2 26 06/24/2014 2001    BUN 16 01/09/2018 0738    BUN 9 06/24/2014 2001    CREATININE 1 09 01/09/2018 0738    CREATININE 1 10 06/24/2014 2001        Component Value Date/Time    CALCIUM 9 1 01/09/2018 0738    CALCIUM 8 7 06/24/2014 2001    ALKPHOS 80 11/10/2017 1200    ALKPHOS 103 06/24/2014 2001    AST 35 11/10/2017 1200    AST 23 06/24/2014 2001    ALT 52 11/10/2017 1200    ALT 39 06/24/2014 2001    BILITOT 0 6 06/24/2014 2001            No results found for: CHOL  No results found for: HDL  No results found for: LDLCALC  No results found for: TRIG  No results found for: CHOLHDL    IMAGING: No results found  Cardiac testing:   No results found for this or any previous visit  No results found for this or any previous visit    No results found for this or any previous visit  No results found for this or any previous visit          I reviewed and interpreted the following LABS/EKG/TELE/IMAGING and below is summary of my interpretation (if data available):    Current EKG and Rhythm Strip:NSR 83bpm  Nonspecific st changes

## 2020-08-03 NOTE — PROGRESS NOTES
HEART AND VASCULAR  CARDIAC [address]    Outpatient Follow-up  Today's Date: 08/03/20        Patient name: Michael Cespedes  YOB: 1980  Sex: male         Chief Complaint: f/u svt      ASSESSMENT:  Problem List Items Addressed This Visit     None      Visit Diagnoses     Supraventricular tachycardia [facility])    -  Primary    Relevant Orders    POCT ECG        37 yo male  1) Atypcal AVNRT ablation       ) 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  No recurrence since, he has a loop recorder  Prior toa ablation SVT 240bpm, he has had episodes of Heart racing w ear ringing up to 15 minutes and episode of heart racing w syncope in shower  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  had Cariac Cath aug 2016 that was normal  EF normal  Bas[place]ien EKG is normal w/o preexciation  Loop is out of batteris since June  PLAN:  Loop recorder removal      Orders Placed This Encounter   Procedures    POCT ECG     Medications Discontinued During This Encounter   Medication Reason    candesartan (ATACAND) 8 MG tablet Therapy completed    diazepam (VALIUM) 5 mg tablet Therapy completed             HPI/Subjective:   Karmen Lara doing well  Feels good  No CP/SOB, no palpitations  He is feeling good  NO recurrence since ablation  Please note HPI is listed by problem with with update following it, it is copied again in the assessment above and reflects medical decision making as well  Complete 12 point ROS reviewed and otherwise non pertinent or negative except as per HPI pertinent positives in Cardiovascular and Respiratory emphasized  Please see paper chart for outpatient clinic patients where the patient completed the 12 point ROS survey             Past Medical History:   Diagnosis Date    DVT (deep venous thrombosis) (HCC)     left     SVT (supraventricular tachycardia) (HCC)        No Known Allergies  I reviewed the Home Medication list and Allergies in the chart  Scheduled Meds:  No current outpatient medications on file  No current facility-administered medications for this visit  PRN Meds:  No family history on file      Social History     Socioeconomic History    Marital status:      Spouse name: Not on file    Number of children: Not on file    Years of education: Not on file    Highest education level: Not on file   Occupational History    Not on file   Social Needs    Financial resource strain: Not on file    Food insecurity     Worry: Not on file     Inability: Not on file    Transportation needs     Medical: Not on file     Non-medical: Not on file   Tobacco Use    Smoking status: Never Smoker    Smokeless tobacco: Former User     Types: Chew   Substance and Sexual Activity    Alcohol use: No    Drug use: Never    Sexual activity: Not on file   Lifestyle    Physical activity     Days per week: Not on file     Minutes per session: Not on file    Stress: Not on file   Relationships    Social connections     Talks on phone: Not on file     Gets together: Not on file     Attends Ep[employer] service: Not on file     Active member of club or organization: Not on file     Attends meetings of clubs or organizations: Not on file     Relationship status: Not on file    Intimate partner violence     Fear of current or ex partner: Not on file     Emotionally abused: Not on file     Physically abused: Not on file     Forced sexual activity: Not on file   Other Topics Concern    Not on file   Social History Narrative    Not on file         OBJECTIVE:    /84 (BP Location: Left arm, Patient Position: Sitting, Cuff Size: Large)   Pulse 83   Ht 6' 4"   Wt 120 kg (264 lb 14 4 oz)   BMI 32 24 kg/m²   Vitals:    08/03/20 1635   Weight: 120 kg (264 lb 14 4 oz)     GEN: No acute distress, Alert and oriented, well appearing  HEENT:Head, neck, ears, oral pharynx: Mucus membranes moist, oral pharynx clear, nares clear  External ears normal  EYES: Pupils equal, sclera anicteric, midline, normal conjuctiva  NECK: No JVD, supple, no obvious masses or thryomegaly or goiter  CARDIOVASCULAR:  RRR, No murmur, rub, gallops S1,S2  LUNGS: Clear To auscultation bilaterally, normal effort, no rales, rhonchi, crackles  ABDOMEN:  nondistended,  without obvious organomegaly or ascites  EXTREMITIES/VASCULAR:  No edema  Radial pulses intact, pedal pulses difficult to palpate, warm an well perfused  PSYCH: Normal Affect, no overt suicidal ideation, linear speech pattern without evidence of psychosis  NEURO: Grossly intact, moving all extremiteis equal, face symmetric, alert and responsive, no obvious focal defecits  GAIT:  Ambulates normally without difficulty  HEME: No bleeding, bruising, petechia, purpura  SKIN: No significant rashes, warm, no diaphoresis or pallor  Lab Results:       LABS:      Chemistry        Component Value Date/Time     (L) 06/24/2014 2001    K 5 3 01/09/2018 0738    K 3 6 06/24/2014 2001     01/09/2018 0738     06/24/2014 2001    CO2 28 01/09/2018 0738    CO2 26 06/24/2014 2001    BUN 16 01/09/2018 0738    BUN 9 06/24/2014 2001    CREATININE 1 09 01/09/2018 0738    CREATININE 1 10 06/24/2014 2001        Component Value Date/Time    CALCIUM 9 1 01/09/2018 0738    CALCIUM 8 7 06/24/2014 2001    ALKPHOS 80 11/10/2017 1200    ALKPHOS 103 06/24/2014 2001    AST 35 11/10/2017 1200    AST 23 06/24/2014 2001    ALT 52 11/10/2017 1200    ALT 39 06/24/2014 2001    BILITOT 0 6 06/24/2014 2001            No results found for: CHOL  No results found for: HDL  No results found for: LDLCALC  No results found for: TRIG  No results found for: CHOLHDL    IMAGING: No results found  Cardiac testing:   No results found for this or any previous visit  No results found for this or any previous visit    No results found for this or any previous visit  No results found for this or any previous visit          I reviewed and interpreted the following LABS/EKG/TELE/IMAGING and below is summary of my interpretation (if data available):    Current EKG and Rhythm Strip:NSR 83bpm  Nonspecific st changes

## 2020-08-04 ENCOUNTER — TELEPHONE (OUTPATIENT)
Dept: CARDIOLOGY CLINIC | Facility: CLINIC | Age: 40
End: 2020-08-04

## 2020-08-04 DIAGNOSIS — I47.1 SUPRAVENTRICULAR TACHYCARDIA (HCC): Primary | ICD-10-CM

## 2020-08-04 NOTE — TELEPHONE ENCOUNTER
COVID Pre-Visit Screening     1  Is this a family member screening? Yes  2  Have you traveled outside of your state in the past 2 weeks? No  3  Do you presently have a fever or flu-like symptoms? No  4  Do you have symptoms of an upper respiratory infection like runny nose, sore throat, or cough? No  5  Are you suffering from new headache that you have not had in the past?  No  6  Do you have/have you experienced any new shortness of breath recently? No  7  Do you have any new diarrhea, nausea or vomiting? No  8  Have you been in contact with anyone who has been sick or diagnosed with COVID-19? No  9  Do you have any new loss of taste or smell? No  10  Are you able to wear a mask without a valve for the entire visit? Yes    Patient schedule for loop explant at [facility] on 8/27/20 with Dr Davion Luna  Patient aware of general instructions, blood test require    Please Porter can you check for insurance approval

## 2020-08-04 NOTE — TELEPHONE ENCOUNTER
----- Message from Jean Claude Gutiérrez MD sent at 8/3/2020  4:52 PM EDT -----  Please call to have loop removed

## 2020-08-06 NOTE — TELEPHONE ENCOUNTER
He does not need auth for his Loop explant [id number] on 8/27/20 at [facility] per Availity online 8/6/20

## 2020-08-22 ENCOUNTER — APPOINTMENT (OUTPATIENT)
Dept: LAB | Facility: HOSPITAL | Age: 40
End: 2020-08-22
Payer: COMMERCIAL

## 2020-08-22 LAB
ALBUMIN SERPL BCP-MCNC: 4.4 G/DL (ref 3.4–4.8)
ALP SERPL-CCNC: 66.3 U/L (ref 10–129)
ALT SERPL W P-5'-P-CCNC: 28 U/L (ref 5–63)
ANION GAP SERPL CALCULATED.3IONS-SCNC: 9 MMOL/L (ref 4–13)
AST SERPL W P-5'-P-CCNC: 26 U/L (ref 15–41)
BASOPHILS # BLD AUTO: 0.04 THOUSANDS/ΜL (ref 0–0.1)
BASOPHILS NFR BLD AUTO: 1 % (ref 0–1)
BILIRUB SERPL-MCNC: 0.87 MG/DL (ref 0.3–1.2)
BUN SERPL-MCNC: 15 MG/DL (ref 6–20)
CALCIUM SERPL-MCNC: 9.1 MG/DL (ref 8.4–10.2)
CHLORIDE SERPL-SCNC: 103 MMOL/L (ref 96–108)
CO2 SERPL-SCNC: 25 MMOL/L (ref 22–33)
CREAT SERPL-MCNC: 1.39 MG/DL (ref 0.5–1.2)
EOSINOPHIL # BLD AUTO: 0.05 THOUSAND/ΜL (ref 0–0.61)
EOSINOPHIL NFR BLD AUTO: 1 % (ref 0–6)
ERYTHROCYTE [DISTWIDTH] IN BLOOD BY AUTOMATED COUNT: 12.9 % (ref 11.6–15.1)
GFR SERPL CREATININE-BSD FRML MDRD: 63 ML/MIN/1.73SQ M
GLUCOSE SERPL-MCNC: 99 MG/DL (ref 65–140)
HCT VFR BLD AUTO: 51.8 % (ref 36.5–49.3)
HGB BLD-MCNC: 18.1 G/DL (ref 12–17)
IMM GRANULOCYTES # BLD AUTO: 0.05 THOUSAND/UL (ref 0–0.2)
IMM GRANULOCYTES NFR BLD AUTO: 1 % (ref 0–2)
LYMPHOCYTES # BLD AUTO: 1.02 THOUSANDS/ΜL (ref 0.6–4.47)
LYMPHOCYTES NFR BLD AUTO: 14 % (ref 14–44)
MCH RBC QN AUTO: 32.3 PG (ref 26.8–34.3)
MCHC RBC AUTO-ENTMCNC: 34.9 G/DL (ref 31.4–37.4)
MCV RBC AUTO: 93 FL (ref 82–98)
MONOCYTES # BLD AUTO: 0.6 THOUSAND/ΜL (ref 0.17–1.22)
MONOCYTES NFR BLD AUTO: 8 % (ref 4–12)
NEUTROPHILS # BLD AUTO: 5.54 THOUSANDS/ΜL (ref 1.85–7.62)
NEUTS SEG NFR BLD AUTO: 75 % (ref 43–75)
PLATELET # BLD AUTO: 290 THOUSANDS/UL (ref 149–390)
PMV BLD AUTO: 10.3 FL (ref 8.9–12.7)
POTASSIUM SERPL-SCNC: 4.1 MMOL/L (ref 3.5–5)
PROT SERPL-MCNC: 6.7 G/DL (ref 6.4–8.3)
RBC # BLD AUTO: 5.6 MILLION/UL (ref 3.88–5.62)
SODIUM SERPL-SCNC: 137 MMOL/L (ref 133–145)
WBC # BLD AUTO: 7.3 THOUSAND/UL (ref 4.31–10.16)

## 2020-08-22 PROCEDURE — 36415 COLL VENOUS BLD VENIPUNCTURE: CPT

## 2020-08-22 PROCEDURE — 80053 COMPREHEN METABOLIC PANEL: CPT

## 2020-08-22 PROCEDURE — 85025 COMPLETE CBC W/AUTO DIFF WBC: CPT

## 2020-08-24 ENCOUNTER — PATIENT MESSAGE (OUTPATIENT)
Dept: NON INVASIVE DIAGNOSTICS | Facility: HOSPITAL | Age: 40
End: 2020-08-24

## 2020-08-24 NOTE — TELEPHONE ENCOUNTER
Cr was slightly elevated 1 39 on preop labs  Please forward to PCP Dr Jia Olivares and let patient know to follow up with him regarding this

## 2020-08-26 ENCOUNTER — TELEPHONE (OUTPATIENT)
Dept: INPATIENT UNIT | Facility: HOSPITAL | Age: 40
End: 2020-08-26

## 2020-08-27 ENCOUNTER — HOSPITAL ENCOUNTER (OUTPATIENT)
Dept: NON INVASIVE DIAGNOSTICS | Facility: HOSPITAL | Age: 40
Discharge: HOME | End: 2020-08-27
Attending: INTERNAL MEDICINE | Admitting: INTERNAL MEDICINE
Payer: COMMERCIAL

## 2020-08-27 VITALS
OXYGEN SATURATION: 97 % | TEMPERATURE: 97.5 F | HEART RATE: 79 BPM | SYSTOLIC BLOOD PRESSURE: 163 MMHG | DIASTOLIC BLOOD PRESSURE: 99 MMHG | RESPIRATION RATE: 18 BRPM

## 2020-08-27 DIAGNOSIS — I47.1 SUPRAVENTRICULAR TACHYCARDIA (HCC): ICD-10-CM

## 2020-08-27 PROCEDURE — 33286 RMVL SUBQ CAR RHYTHM MNTR: CPT | Performed by: INTERNAL MEDICINE

## 2020-08-27 PROCEDURE — 33286 RMVL SUBQ CAR RHYTHM MNTR: CPT

## 2020-08-27 RX ORDER — LIDOCAINE HYDROCHLORIDE AND EPINEPHRINE 10; 10 MG/ML; UG/ML
INJECTION, SOLUTION INFILTRATION; PERINEURAL CODE/TRAUMA/SEDATION MEDICATION
Status: COMPLETED | OUTPATIENT
Start: 2020-08-27 | End: 2020-08-27

## 2020-08-27 RX ORDER — LIDOCAINE HYDROCHLORIDE AND EPINEPHRINE 10; 10 MG/ML; UG/ML
1 INJECTION, SOLUTION INFILTRATION; PERINEURAL ONCE
Status: DISCONTINUED | OUTPATIENT
Start: 2020-08-27 | End: 2020-08-27 | Stop reason: HOSPADM

## 2020-08-27 RX ADMIN — LIDOCAINE HYDROCHLORIDE,EPINEPHRINE BITARTRATE 15 ML: 10; .01 INJECTION, SOLUTION INFILTRATION; PERINEURAL at 08:31

## 2020-08-27 NOTE — INTERVAL H&P NOTE
MT is a 36year old male w/ SVT s/p RFA who presents to [facility] under direction of Dr Manjinder Rothman for loop explant  For full details leading to procedure please see office note from Dr Manjinder Rothman on 8-3-2020  /99 (BP Location: Right arm)   Pulse 79   Temp 97 5 °F (36 4 °C) (Oral)   Resp 18   SpO2 97%   No concerns or complaints today

## 2020-08-27 NOTE — DISCHARGE INSTRUCTIONS
Ok to shower but do not scrub wound area under glue for 1 week, glue will peel off on its own  Do not swim for 14 days  Do not use lotions/powders/creams on incision  Remove outer bandage 48 hours after procedure - if present, leave underlying steri-strips in place, they will either fall off on their own or will be removed at 2 week follow up appointment  Please call the office if you notice redness, swelling, bleeding, or drainage from incision or if you develop fevers

## 2021-07-22 ENCOUNTER — EVALUATION (OUTPATIENT)
Dept: PHYSICAL THERAPY | Age: 41
End: 2021-07-22
Payer: OTHER MISCELLANEOUS

## 2021-07-22 DIAGNOSIS — M54.16 LUMBAR RADICULOPATHY: Primary | ICD-10-CM

## 2021-07-22 PROCEDURE — 97161 PT EVAL LOW COMPLEX 20 MIN: CPT | Performed by: PHYSICAL THERAPIST

## 2021-07-22 NOTE — PROGRESS NOTES
PT Evaluation     Today's date: 2021  Patient name: Elena Rizzo  : 1980  MRN: [id number]  Referring provider: Roxy Garber MD  Dx:   Encounter Diagnosis     ICD-10-CM    1  Lumbar radiculopathy  M54 16                   Assessment  Assessment details: Pt reports to PT with cc of lumbar pain that has been present after injury 5 years ago  Pt has decreased lumbar ROM and LE strength and would benefit from aquatic PT to decrease difficulty with ADLs  Impairments: abnormal coordination, abnormal gait, abnormal muscle firing, abnormal muscle tone, abnormal or restricted ROM, abnormal movement, activity intolerance, impaired physical strength, lacks appropriate home exercise program, weight-bearing intolerance and poor body mechanics    Goals  In 4 weeks pt will:  -Be independent with phase I of HEP  -Increase LE strength by 1/2 grade  -Increase Lumbar ROM by 25%    By discharge pt will:  -Be independent with Phase II of HEP  -Demonstrate full LE strength  -Increase lumbar ROM by 50%  -Report minimal pain with ADLs    Plan  Patient would benefit from: skilled physical therapy  Planned therapy interventions: aquatic therapy, joint mobilization, manual therapy, motor coordination training, muscle pump exercises, neuromuscular re-education, body mechanics training, patient education, therapeutic activities, therapeutic exercise, strengthening, stretching, fine motor coordination training, functional ROM exercises and home exercise program  Frequency: 2x week  Duration in weeks: 4  Plan of Care beginning date: 2021  Plan of Care expiration date: 2021  Treatment plan discussed with: patient and PTA        Subjective Evaluation    History of Present Illness  Mechanism of injury: Pt reports to PT with cc of lumbar pain that has been present after injury 5 years ago  Pt had lumbar surgery 5 years ago, with revision   Pt has been in PT since that time, with minimal improvement   Pt states he is here for aquatic PT for next 3 weeks before discussing spinal fusion with referring physician     Patient Goals  Patient goals for therapy: decreased pain, increased motion, increased strength, independence with ADLs/IADLs and return to sport/leisure activities          Objective     Active Range of Motion     Additional Active Range of Motion Details  Lumbar ROM as % of normal ROM    Flex:10  Ext:20  R ROT:25  L ROT:25      Strength/Myotome Testing     Left Hip   Planes of Motion   Abduction: 3+    Right Hip   Planes of Motion   Abduction: 3+    Left Knee   Flexion: 4  Extension: 4    Right Knee   Flexion: 4  Extension: 4    Left Ankle/Foot   Dorsiflexion: 4  Plantar flexion: 4    Right Ankle/Foot   Dorsiflexion: 3+  Plantar flexion: 3+    Tests     Lumbar     Left   Negative slump test      Right   Positive slump test               Precautions: Lumbar surgery 2/21      Manuals                                                                 Neuro Re-Ed                                                                                                        Ther Ex                                                                                                                     Ther Activity                                       Gait Training                                       Modalities

## 2021-07-22 NOTE — LETTER
2021    [address]  [address] [place] [address]    Patient: Cheryl Matos   YOB: 1980   Date of Visit: 2021     Encounter Diagnosis     ICD-10-CM    1  Lumbar radiculopathy  M54 16        Dear Dr Ricki Vaughn: Thank you for your recent referral of Cheryl Matos  Please review the attached evaluation summary from [facility] recent visit  Please verify that you agree with the plan of care by signing the attached order  If you have any questions or concerns, please do not hesitate to call  I sincerely appreciate the opportunity to share in the care of one of your patients and hope to have another opportunity to work with you in the near future  Sincerely,    Ayleen Chavira, PT      Referring Provider:      I certify that I have read the below Plan of Care and certify the need for these services furnished under this plan of treatment while under my care  Froylan Erwin MD  [id number] [address]  [address] [place]          PT Evaluation     Today's date: 2021  Patient name: Cheryl Matos  : 1980  MRN: [id number]  Referring provider: David Morales MD  Dx:   Encounter Diagnosis     ICD-10-CM    1  Lumbar radiculopathy  M54 16                   Assessment  Assessment details: Pt reports to PT with cc of lumbar pain that has been present after injury 5 years ago  Pt has decreased lumbar ROM and LE strength and would benefit from aquatic PT to decrease difficulty with ADLs     Impairments: abnormal coordination, abnormal gait, abnormal muscle firing, abnormal muscle tone, abnormal or restricted ROM, abnormal movement, activity intolerance, impaired physical strength, lacks appropriate home exercise program, weight-bearing intolerance and poor body mechanics    Goals  In 4 weeks pt will:  -Be independent with phase I of HEP  -Increase LE strength by 1/2 grade  -Increase Lumbar ROM by 25%    By discharge pt will:  -Be independent with Phase II of HEP  -Demonstrate full LE strength  -Increase lumbar ROM by 50%  -Report minimal pain with ADLs    Plan  Patient would benefit from: skilled physical therapy  Planned therapy interventions: aquatic therapy, joint mobilization, manual therapy, motor coordination training, muscle pump exercises, neuromuscular re-education, body mechanics training, patient education, therapeutic activities, therapeutic exercise, strengthening, stretching, fine motor coordination training, functional ROM exercises and home exercise program  Frequency: 2x week  Duration in weeks: 4  Plan of Care beginning date: 7/22/2021  Plan of Care expiration date: 8/26/2021  Treatment plan discussed with: patient and PTA        Subjective Evaluation    History of Present Illness  Mechanism of injury: Pt reports to PT with cc of lumbar pain that has been present after injury 5 years ago  Pt had lumbar surgery 5 years ago, with revision 2/21  Pt has been in PT since that time, with minimal improvement  Pt states he is here for aquatic PT for next 3 weeks before discussing spinal fusion with referring physician     Patient Goals  Patient goals for therapy: decreased pain, increased motion, increased strength, independence with ADLs/IADLs and return to sport/leisure activities          Objective     Active Range of Motion     Additional Active Range of Motion Details  Lumbar ROM as % of normal ROM    Flex:10  Ext:20  R ROT:25  L ROT:25      Strength/Myotome Testing     Left Hip   Planes of Motion   Abduction: 3+    Right Hip   Planes of Motion   Abduction: 3+    Left Knee   Flexion: 4  Extension: 4    Right Knee   Flexion: 4  Extension: 4    Left Ankle/Foot   Dorsiflexion: 4  Plantar flexion: 4    Right Ankle/Foot   Dorsiflexion: 3+  Plantar flexion: 3+    Tests     Lumbar     Left   Negative slump test      Right   Positive slump test               Precautions: Lumbar surgery 2/21      Manuals                                                                 Neuro Re-Ed                                                                                                        Ther Ex                                                                                                                     Ther Activity                                       Gait Training                                       Modalities

## 2021-07-28 ENCOUNTER — OFFICE VISIT (OUTPATIENT)
Dept: PHYSICAL THERAPY | Age: 41
End: 2021-07-28
Payer: OTHER MISCELLANEOUS

## 2021-07-28 DIAGNOSIS — M54.16 LUMBAR RADICULOPATHY: Primary | ICD-10-CM

## 2021-07-28 PROCEDURE — 97113 AQUATIC THERAPY/EXERCISES: CPT | Performed by: SPECIALIST/TECHNOLOGIST

## 2021-07-28 NOTE — PROGRESS NOTES
Daily Note     Today's date: 2021  Patient name: Meche Graves  : 1980  MRN: [id number]  Referring provider: Vinicius Nunez MD  Dx:   Encounter Diagnosis     ICD-10-CM    1  Lumbar radiculopathy  M54 16                   Subjective: Pt reports RLE sciatic symptoms extending to knee  Objective: See treatment diary below    Assessment: No exacerbation of LBP or sciatic symptoms with therex assigned this visit  Limited lumbar motion in all planes, pt would benefit from continued PT  Tolerated treatment well  Patient demonstrated fatigue post treatment, exhibited good technique with therapeutic exercises and would benefit from continued PT    Plan: Continue per plan of care  Progress treatment as tolerated            Precautions: Lumbar surgery     Manuals                                                                 Neuro Re-Ed                                                                                                        Ther Ex             laps, ss 5x ea            SKTC, DKTC 10x10"            Standing Ext 20x             Seated Turtle Flexion  10x10s C             HS/Piriformis 3x30s ea            3 way 30x ea            Ball Press Downs 20x ea            Board Press Downs 30x            SL Squats             UE DB's 4 way 30x                                                                Ther Activity                                       Gait Training                                       Modalities

## 2021-07-30 ENCOUNTER — OFFICE VISIT (OUTPATIENT)
Dept: PHYSICAL THERAPY | Age: 41
End: 2021-07-30
Payer: OTHER MISCELLANEOUS

## 2021-07-30 DIAGNOSIS — M54.16 LUMBAR RADICULOPATHY: Primary | ICD-10-CM

## 2021-07-30 PROCEDURE — 97113 AQUATIC THERAPY/EXERCISES: CPT | Performed by: SPECIALIST/TECHNOLOGIST

## 2022-05-23 ENCOUNTER — APPOINTMENT (EMERGENCY)
Dept: CT IMAGING | Facility: HOSPITAL | Age: 42
End: 2022-05-23
Payer: COMMERCIAL

## 2022-05-23 ENCOUNTER — HOSPITAL ENCOUNTER (EMERGENCY)
Facility: HOSPITAL | Age: 42
Discharge: HOME | End: 2022-05-23
Attending: EMERGENCY MEDICINE
Payer: COMMERCIAL

## 2022-05-23 VITALS
DIASTOLIC BLOOD PRESSURE: 83 MMHG | RESPIRATION RATE: 18 BRPM | HEART RATE: 83 BPM | WEIGHT: 266.76 LBS | OXYGEN SATURATION: 99 % | SYSTOLIC BLOOD PRESSURE: 152 MMHG | TEMPERATURE: 98.5 F | BODY MASS INDEX: 32.47 KG/M2

## 2022-05-23 DIAGNOSIS — S20.212A RIB CONTUSION, LEFT, INITIAL ENCOUNTER: Primary | ICD-10-CM

## 2022-05-23 PROCEDURE — 99285 EMERGENCY DEPT VISIT HI MDM: CPT | Performed by: EMERGENCY MEDICINE

## 2022-05-23 PROCEDURE — 96374 THER/PROPH/DIAG INJ IV PUSH: CPT

## 2022-05-23 PROCEDURE — 99284 EMERGENCY DEPT VISIT MOD MDM: CPT

## 2022-05-23 PROCEDURE — 96361 HYDRATE IV INFUSION ADD-ON: CPT

## 2022-05-23 PROCEDURE — 74176 CT ABD & PELVIS W/O CONTRAST: CPT

## 2022-05-23 PROCEDURE — 71250 CT THORAX DX C-: CPT

## 2022-05-23 RX ORDER — KETOROLAC TROMETHAMINE 30 MG/ML
30 INJECTION, SOLUTION INTRAMUSCULAR; INTRAVENOUS ONCE
Status: COMPLETED | OUTPATIENT
Start: 2022-05-23 | End: 2022-05-23

## 2022-05-23 RX ORDER — OXYCODONE HYDROCHLORIDE 5 MG/1
5 TABLET ORAL EVERY 4 HOURS PRN
Qty: 20 TABLET | Refills: 0 | Status: SHIPPED | OUTPATIENT
Start: 2022-05-23

## 2022-05-23 RX ORDER — OXYCODONE HYDROCHLORIDE 5 MG/1
5 TABLET ORAL ONCE
Status: COMPLETED | OUTPATIENT
Start: 2022-05-23 | End: 2022-05-23

## 2022-05-23 RX ADMIN — SODIUM CHLORIDE 1000 ML: 0.9 INJECTION, SOLUTION INTRAVENOUS at 17:50

## 2022-05-23 RX ADMIN — KETOROLAC TROMETHAMINE 30 MG: 30 INJECTION, SOLUTION INTRAMUSCULAR at 17:49

## 2022-05-23 RX ADMIN — OXYCODONE HYDROCHLORIDE 5 MG: 5 TABLET ORAL at 18:38

## 2022-05-23 NOTE — DISCHARGE INSTRUCTIONS
1  The examination and treatment that you have received has been on an emergency basis and is not intended as an effort to provide complete medical care  It is impossible to recognize and treat all elements of an illness or injury in a single ER visit  2  Iris Marshall for allowing us to provide emergent medical care to you or your family member  We consider it a privilege to have served you during your illness or injury  3  If you have received a PRESCRIPTION please fill it TODAY and follow the instructions carefully  4  Call your PRIMARY doctor´s office today or the next business day, and tell them you were seen at the ED and that we recommended you be prioritized for an early follow-up appointment in the next 48 hours  Please follow up with any specialists we may have discussed and provided you information on  Also, there may be some RESULTS we have found which are non-emergent but need to be followed up  When you see your primary doctor, have them call and obtain a full copy of the results from our medical records department  Please obtain a copy of the results immediately to follow up with your Primary MD  This is important for continuity of care and if not done, may lead to further issues in your medical care  5  If you do NOT have a primary care doctor, it is important that you [address] [place] [address]  Call your insurance company to get a list of eligible providers  6  RETURN to this or another ER immediately for new, worsening, or concerning symptoms  We are open 24 hours a day and you may return any time  We are happy to see you again to make sure everything is okay  7  PAIN/FEVER RELIEF FOR ADULTS: For MILD PAIN RELIEF, adults can take acetaminophen (Tylenol) 1000 mg every 6 hours (DO NOT TAKE ACETAMINOPHEN IF YOU ARE ALSO TAKING OTHER MEDICATIONS WHICH CONTAIN ACETAMINOPHEN)  For MODERATE PAIN RELIEF ALSO take ibuprofen (Advil, Motrin) 600 mg every 6 hours   Do this for up to five days  You can take the acetaminophen and ibuprofen simultaneously, they have added pain relieving effects when taken at the same time  Do not take acetaminophen or ibuprofen if you have a known allergy or adverse reaction to these medications or if your doctor has advised you not to take them  Do not take Ibuprofen if you are pregnant, or have a history of kidney disease or gastritis or gastrointestinal bleeding  8  PAIN/FEVER RELIEF FOR CHILDREN: For mild pain or fever, give acetaminophen (Tylenol) according to the package instructions for your child's age/weight  For moderate pain/fever in children OVER 6 months also give ibuprofen, according to the package instructions for your child's age/weight  Do this for up to five days  You can give the acetaminophen and ibuprofen simultaneously, they have added pain relieving effects when taken at the same time  Do not give acetaminophen or ibuprofen if your child has a known allergy or adverse reaction to these medications or if your doctor has advised you not to give them  When using these online guides, take care to [address] of the product you are using and match it to the table  9  Be aware that REPEAT READINGS are often done on X-rays, CT scans, ultrasounds and other medical images and that in a small percentage of cases abnormalities are detected on these second readings and you may be contacted if this occurs  10  If you have had a SPLINT applied: be aware that there is always a small chance of a fracture not showing or being missed on imaging  For this reason, do not use or bear weight on the affected limb for the next 5 days  If pain does not resolve completely, or if pain becomes worse or other symptoms develop, see your doctor or return to the ED   You may need repeat imaging or other care

## 2022-05-23 NOTE — ED PROVIDER NOTES
History  Chief Complaint   Patient presents with    Rib Pain     Pt states he slipped in the shower and thinks he broke ribs on his L side  Mechanical trip and fall in shower, struck right posterior lower ribs and left anterior lower ribs and LUQ  No blow to head / loc/ neck pain  No other pain/injury/illness  None       Past Medical History:   Diagnosis Date    DVT (deep venous thrombosis) (HCC)     left     SVT (supraventricular tachycardia) (HCC)        Past Surgical History:   Procedure Laterality Date    ABLATION OF DYSRHYTHMIC FOCUS      BACK SURGERY      KNEE SURGERY         History reviewed  No pertinent family history  I have reviewed and agree with the history as documented  E-Cigarette/Vaping     E-Cigarette/Vaping Substances     Social History     Tobacco Use    Smoking status: Never Smoker    Smokeless tobacco: Former User     Types: Chew   Substance Use Topics    Alcohol use: No    Drug use: Never       Review of Systems   Constitutional: Negative for fever  HENT: Negative for rhinorrhea  Eyes: Negative for visual disturbance  Respiratory: Negative for shortness of breath  Cardiovascular: Negative for palpitations and leg swelling  Gastrointestinal: Negative for abdominal pain, diarrhea and vomiting  Endocrine: Negative for polydipsia  Genitourinary: Negative for dysuria, frequency and hematuria  Musculoskeletal: Negative for neck stiffness  Skin: Negative for rash  Allergic/Immunologic: Negative for immunocompromised state  Neurological: Negative for speech difficulty, weakness and numbness  Psychiatric/Behavioral: Negative for suicidal ideas  Physical Exam  Physical Exam  Constitutional:       General: He is not in acute distress  Appearance: He is not diaphoretic  HENT:      Head: Normocephalic and atraumatic  Nose: Nose normal       Mouth/Throat:      Pharynx: Oropharynx is clear     Eyes:      Conjunctiva/sclera: Conjunctivae normal    Neck:      Trachea: No tracheal deviation  Comments: Cervical spine nontender no deformity no instability  Cardiovascular:      Rate and Rhythm: Normal rate and regular rhythm  Heart sounds: Normal heart sounds  No murmur heard  Pulmonary:      Effort: Pulmonary effort is normal  No respiratory distress  Breath sounds: Normal breath sounds  No stridor  Comments: Abrasion, ecchymosis over right posterior lower ribs    Tenderness over left anterior lower ribs and LUQ with guarding  Chest:      Chest wall: Tenderness present  Abdominal:      General: Bowel sounds are normal  There is no distension  Palpations: Abdomen is soft  Tenderness: There is abdominal tenderness  There is guarding  There is no right CVA tenderness, left CVA tenderness or rebound  Genitourinary:     Comments: No Cost-Vertebral Angle Tenderness  Musculoskeletal:      Cervical back: Normal range of motion and neck supple  Comments: With exceptions above, thoracic, lumbar and sacral spine nontender, no deformity, no instability, no overlying skin changes  Pelvis stable, nontender  With exceptions above, all limbs nontender, no deformities, no instability, joints stable FROM, distal LT sensation intact, distal tendon and intrinsic motor function intact, distal pulses and perfusion intact  Skin:     General: Skin is warm and dry  Capillary Refill: Capillary refill takes less than 2 seconds  Neurological:      General: No focal deficit present  Mental Status: He is alert and oriented to person, place, and time  Sensory: No sensory deficit  Motor: No abnormal muscle tone  Psychiatric:         Thought Content:  Thought content normal          Vital Signs  ED Triage Vitals   Temperature Pulse Respirations Blood Pressure SpO2   05/23/22 1722 05/23/22 1722 05/23/22 1722 05/23/22 1722 05/23/22 1722   98 5 °F (36 9 °C) 86 16 (!) 165/106 95 %      Temp Source Heart Rate Source Patient Position - Orthostatic VS BP Location FiO2 (%)   05/23/22 1722 05/23/22 1840 05/23/22 1722 05/23/22 1722 --   Oral Monitor Sitting Right arm       Pain Score       05/23/22 1749       9           Vitals:    05/23/22 1722 05/23/22 1840   BP: (!) 165/106 152/83   Pulse: 86 83   Patient Position - Orthostatic VS: Sitting          Visual Acuity      ED Medications  Medications   sodium chloride 0 9 % bolus 1,000 mL (0 mL Intravenous Stopped 5/23/22 1842)   ketorolac (TORADOL) injection 30 mg (30 mg Intravenous Given 5/23/22 1749)   oxyCODONE (ROXICODONE) IR tablet 5 mg (5 mg Oral Given 5/23/22 1838)       Diagnostic Studies  Results Reviewed     None                 CT chest abdomen pelvis wo contrast   Final Result by Laureano Huntley MD (05/23 1835)      No left rib fracture is seen  Workstation performed: [id number]                    Procedures  Procedures         ED Course  ED Course as of 05/25/22 1144   Mon May 23, 2022   1737 D w dr Anna Mays - 2/2 contrast shortage non-con ct                               SBIRT 20yo+    Flowsheet Row Most Recent Value   SBIRT (25 yo +)    In order to provide better care to our patients, we are screening all of our patients for alcohol and drug use  Would it be okay to ask you these screening questions? No Filed at: 05/23/2022 1723                    [facility]  Number of Diagnoses or Management Options  Rib contusion, left, initial encounter  Diagnosis management comments: Blow to right and left lower ribs, with left lower rib and LUQ tenderness  D/r rads - non-con 2/2 shortage  On my own wet read no fluid/ptx/edith seen but I do note a small defect posterior aspect spleen - read pending      Reading neg - manage as rib contusions / occult fx with oxycodone, nsaid      Disposition  Final diagnoses:   Rib contusion, left, initial encounter     Time reflects when diagnosis was documented in both MDM as applicable and the Disposition within this note     Time User Action Codes Description Comment    5/23/2022  6:37 PM Quyen Doyle Add [[id number]] Rib contusion, left, initial encounter       ED Disposition     ED Disposition   Discharge    Condition   Stable    Date/Time   Mon May 23, 2022  6:37 PM    Comment   Harry Hillman discharge to home/self care  Follow-up Information     Follow up With Specialties Details Why Contact Info    Ben Johansen MD  Schedule an appointment as soon as possible for a visit in 2 days  [address] [address]   [phone]            Discharge Medication List as of 5/23/2022  6:39 PM      START taking these medications    Details   oxyCODONE (Roxicodone) 5 immediate release tablet Take 1 tablet (5 mg total) by mouth every 4 (four) hours as needed for moderate pain for up to 20 doses Max Daily Amount: 30 mg, Starting Mon 5/23/2022, Normal             No discharge procedures on file      PDMP Review     None          ED Provider  Electronically Signed by           Felisha Rivera MD  05/25/22 7757

## 2023-02-16 ENCOUNTER — OFFICE VISIT (OUTPATIENT)
Dept: URGENT CARE | Facility: CLINIC | Age: 43
End: 2023-02-16

## 2023-02-16 VITALS
HEIGHT: 76 IN | SYSTOLIC BLOOD PRESSURE: 149 MMHG | DIASTOLIC BLOOD PRESSURE: 79 MMHG | RESPIRATION RATE: 16 BRPM | HEART RATE: 90 BPM | OXYGEN SATURATION: 95 % | WEIGHT: 275 LBS | BODY MASS INDEX: 33.49 KG/M2 | TEMPERATURE: 99.6 F

## 2023-02-16 DIAGNOSIS — J06.9 ACUTE URI: Primary | ICD-10-CM

## 2023-02-16 RX ORDER — AMOXICILLIN AND CLAVULANATE POTASSIUM 875; 125 MG/1; MG/1
1 TABLET, FILM COATED ORAL EVERY 12 HOURS SCHEDULED
Qty: 14 TABLET | Refills: 0 | Status: SHIPPED | OUTPATIENT
Start: 2023-02-16 | End: 2023-02-23

## 2023-02-16 NOTE — PROGRESS NOTES
[facility] [facility]        NAME: Rachid Eagle is a 43 y o  male  : 1980    MRN: [id number]  DATE: 2023  TIME: 9:58 AM    Assessment and Plan   Acute URI [J06 9]  1  Acute URI  amoxicillin-clavulanate (AUGMENTIN) 875-125 mg per tablet            Patient Instructions       Follow up with PCP in 3-5 days  Proceed to  ER if symptoms worsen  Chief Complaint     Chief Complaint   Patient presents with   • URI     Chest congestion, cough, fever x 2 days oc Advil         History of Present Illness       HPI  Patient presents today complaining of a lot of upper respiratory symptoms ongoing for the past few days  Patient is a lot of chest congestion cough and is also inferior  Patient is taking over-the-counter Advil with minimal to no relief    Patient does not have any shortness of breath or difficulty breathing to that extent  Review of Systems   Review of Systems  Per HPI    Current Medications       Current Outpatient Medications:   •  amoxicillin-clavulanate (AUGMENTIN) 875-125 mg per tablet, Take 1 tablet by mouth every 12 (twelve) hours for 7 days, Disp: 14 tablet, Rfl: 0  •  oxyCODONE (Roxicodone) 5 immediate release tablet, Take 1 tablet (5 mg total) by mouth every 4 (four) hours as needed for moderate pain for up to 20 doses Max Daily Amount: 30 mg (Patient not taking: Reported on 2023), Disp: 20 tablet, Rfl: 0    Current Allergies     Allergies as of 2023   • (No Known Allergies)            The following portions of the patient's history were reviewed and updated as appropriate: allergies, current medications, past family history, past medical history, past social history, past surgical history and problem list      Past Medical History:   Diagnosis Date   • DVT (deep venous thrombosis) ([facility])     left    • SVT (supraventricular tachycardia) ([address] )        Past Surgical History:   Procedure Laterality Date   • ABLATION OF DYSRHYTHMIC FOCUS     • BACK SURGERY     • KNEE SURGERY         No family history on file  Medications have been verified  Objective   /79 (Patient Position: Sitting, Cuff Size: Large)   Pulse 90   Temp 99 6 °F (37 6 °C)   Resp 16   Ht 6' 4" (1 93 m)   Wt 125 kg (275 lb)   SpO2 95%   BMI 33 47 kg/m²   No LMP for male patient  Physical Exam     Physical Exam  Constitutional:       General: He is not in acute distress  Appearance: He is well-developed  He is not diaphoretic  HENT:      Head: Normocephalic and atraumatic  Right Ear: Tympanic membrane normal       Left Ear: Tympanic membrane normal       Nose: Congestion present  Mouth/Throat:      Pharynx: Posterior oropharyngeal erythema present  Cardiovascular:      Rate and Rhythm: Normal rate and regular rhythm  Heart sounds: Normal heart sounds  Pulmonary:      Effort: Pulmonary effort is normal  No respiratory distress  Breath sounds: Normal breath sounds  No wheezing  Neurological:      Mental Status: He is alert and oriented to person, place, and time

## 2023-02-22 ENCOUNTER — OFFICE VISIT (OUTPATIENT)
Dept: FAMILY MEDICINE CLINIC | Facility: CLINIC | Age: 43
End: 2023-02-22

## 2023-02-22 VITALS
HEART RATE: 74 BPM | RESPIRATION RATE: 18 BRPM | OXYGEN SATURATION: 98 % | BODY MASS INDEX: 33.85 KG/M2 | HEIGHT: 76 IN | WEIGHT: 278 LBS | TEMPERATURE: 97.2 F | SYSTOLIC BLOOD PRESSURE: 144 MMHG | DIASTOLIC BLOOD PRESSURE: 92 MMHG

## 2023-02-22 DIAGNOSIS — J40 BRONCHITIS: Primary | ICD-10-CM

## 2023-02-22 DIAGNOSIS — R06.2 WHEEZING: ICD-10-CM

## 2023-02-22 RX ORDER — FLUTICASONE PROPIONATE 220 UG/1
1 AEROSOL, METERED RESPIRATORY (INHALATION) 2 TIMES DAILY
Qty: 12 G | Refills: 0 | Status: SHIPPED | OUTPATIENT
Start: 2023-02-22 | End: 2023-02-22

## 2023-02-22 RX ORDER — FLUTICASONE PROPIONATE AND SALMETEROL 250; 50 UG/1; UG/1
1 POWDER RESPIRATORY (INHALATION) 2 TIMES DAILY
Qty: 60 BLISTER | Refills: 0 | Status: SHIPPED | OUTPATIENT
Start: 2023-02-22

## 2023-02-22 NOTE — PROGRESS NOTES
Assessment/Plan:       Problem List Items Addressed This Visit        Respiratory    Bronchitis - Primary     Likely post viral cough that should improve in 2-4 weeks   Trial of Flovent BID to see if this helps  Also recommended Zyrtec or Claritin daily to see if this helps with post nasal drip component  Follow up if fever or new symptoms develop or no improvement after 4 weeks         Relevant Medications    fluticasone (Flovent HFA) 220 mcg/act inhaler         Subjective:     Chief Complaint   Patient presents with   • Nasal Congestion   • Cough          Patient ID: Jono Herrera is a 43 y o  male who presents with cough  Started over a week ago  He has fever with Tmax of 102 initially but this resolved  He overall feels fine and not "sick" just had a cough every time he exerts himself and with sleeping  Not taking any medication at this time  No allergies that he is aware of  Cough is worse at night  Not limited in activity  He is traveling soon so wanted to be checked  [facility] went to [facility] and was given Augmentin which he completes today  Denies significant congestion  Patient's past medical history, surgical history, family history, medications, allergies and social history reviewed and updated    Review of Systems   Constitutional: Negative  HENT: Negative  Respiratory: Positive for cough and chest tightness  Negative for wheezing  Cardiovascular: Negative for chest pain  Gastrointestinal: Negative  All other ROS negative  Objective:    Vitals:    02/22/23 0952   BP: 144/92   Pulse: 74   Resp: 18   Temp: (!) 97 2 °F (36 2 °C)   SpO2: 98%          Physical Exam  Vitals reviewed  Constitutional:       General: He is not in acute distress  HENT:      Right Ear: External ear normal       Left Ear: External ear normal       Nose: Nose normal    Eyes:      Conjunctiva/sclera: Conjunctivae normal    Cardiovascular:      Rate and Rhythm: Normal rate and regular rhythm  Heart sounds: No murmur heard  Pulmonary:      Effort: Pulmonary effort is normal  No respiratory distress  Breath sounds: No wheezing  Comments: Diminished breath sounds bilaterally  Abdominal:      Palpations: Abdomen is soft  Tenderness: There is no abdominal tenderness  Musculoskeletal:      Cervical back: Neck supple  Lymphadenopathy:      Cervical: No cervical adenopathy  Neurological:      Mental Status: He is alert and oriented to person, place, and time               Mary Ann Butler MD  Internal Medicine and Pediatrics

## 2023-02-22 NOTE — ASSESSMENT & PLAN NOTE
Likely post viral cough that should improve in 2-4 weeks   Trial of Flovent BID to see if this helps  Also recommended Zyrtec or Claritin daily to see if this helps with post nasal drip component  Follow up if fever or new symptoms develop or no improvement after 4 weeks

## 2023-02-24 RX ORDER — ALBUTEROL SULFATE 90 UG/1
2 AEROSOL, METERED RESPIRATORY (INHALATION) EVERY 4 HOURS PRN
Qty: 6.7 G | Refills: 0 | Status: SHIPPED | OUTPATIENT
Start: 2023-02-24

## 2024-01-18 ENCOUNTER — HOSPITAL ENCOUNTER (OUTPATIENT)
Dept: RADIOLOGY | Facility: HOSPITAL | Age: 44
End: 2024-01-18
Payer: COMMERCIAL

## 2024-01-18 ENCOUNTER — OFFICE VISIT (OUTPATIENT)
Dept: OBGYN CLINIC | Facility: CLINIC | Age: 44
End: 2024-01-18
Payer: COMMERCIAL

## 2024-01-18 ENCOUNTER — HOSPITAL ENCOUNTER (OUTPATIENT)
Dept: MRI IMAGING | Facility: HOSPITAL | Age: 44
Discharge: HOME | End: 2024-01-18
Payer: COMMERCIAL

## 2024-01-18 VITALS — OXYGEN SATURATION: 96 % | HEART RATE: 85 BPM | BODY MASS INDEX: 33.84 KG/M2 | HEIGHT: 76 IN

## 2024-01-18 DIAGNOSIS — S86.219A TRAUMATIC RUPTURE OF ANTERIOR TIBIAL TENDON, INITIAL ENCOUNTER: ICD-10-CM

## 2024-01-18 DIAGNOSIS — M25.572 PAIN, JOINT, ANKLE AND FOOT, LEFT: Primary | ICD-10-CM

## 2024-01-18 DIAGNOSIS — M25.572 PAIN, JOINT, ANKLE AND FOOT, LEFT: ICD-10-CM

## 2024-01-18 PROBLEM — R09.1 PLEURISY: Status: ACTIVE | Noted: 2019-03-22

## 2024-01-18 PROBLEM — R91.1 SOLITARY PULMONARY NODULE: Status: ACTIVE | Noted: 2019-03-22

## 2024-01-18 PROBLEM — I10 HYPERTENSION: Status: ACTIVE | Noted: 2024-01-18

## 2024-01-18 PROBLEM — I42.1 HYPERTROPHIC OBSTRUCTIVE CARDIOMYOPATHY (HOCM) (HCC): Status: RESOLVED | Noted: 2024-01-18 | Resolved: 2024-01-18

## 2024-01-18 PROBLEM — M48.061 SPINAL STENOSIS OF LUMBAR REGION WITH RADICULOPATHY: Status: ACTIVE | Noted: 2021-09-27

## 2024-01-18 PROBLEM — M54.16 LUMBAR RADICULOPATHY: Status: ACTIVE | Noted: 2020-11-11

## 2024-01-18 PROBLEM — I47.19 AVNRT (AV NODAL RE-ENTRY TACHYCARDIA): Status: ACTIVE | Noted: 2021-02-03

## 2024-01-18 PROBLEM — M99.04 SACROILIAC JOINT SOMATIC DYSFUNCTION: Status: ACTIVE | Noted: 2020-11-11

## 2024-01-18 PROBLEM — M51.26 HERNIATED LUMBAR INTERVERTEBRAL DISC: Status: ACTIVE | Noted: 2021-02-04

## 2024-01-18 PROBLEM — I47.10 PAROXYSMAL SVT (SUPRAVENTRICULAR TACHYCARDIA): Status: ACTIVE | Noted: 2017-11-09

## 2024-01-18 PROBLEM — R55 VASOVAGAL SYNCOPE: Status: ACTIVE | Noted: 2017-08-18

## 2024-01-18 PROBLEM — M54.16 SPINAL STENOSIS OF LUMBAR REGION WITH RADICULOPATHY: Status: ACTIVE | Noted: 2021-09-27

## 2024-01-18 PROBLEM — I42.1 HYPERTROPHIC OBSTRUCTIVE CARDIOMYOPATHY (HOCM) (HCC): Status: ACTIVE | Noted: 2024-01-18

## 2024-01-18 PROCEDURE — G1004 CDSM NDSC: HCPCS

## 2024-01-18 PROCEDURE — 99203 OFFICE O/P NEW LOW 30 MIN: CPT | Performed by: PHYSICIAN ASSISTANT

## 2024-01-18 PROCEDURE — 73721 MRI JNT OF LWR EXTRE W/O DYE: CPT

## 2024-01-18 PROCEDURE — 73610 X-RAY EXAM OF ANKLE: CPT

## 2024-01-18 NOTE — PROGRESS NOTES
Assessment/Plan   Diagnoses and all orders for this visit:    Pain, joint, ankle and foot, left    Suspected Traumatic rupture of anterior tibial tendon    - MRI ankle, left   - CAM boot, crutches  - Ice as needed  - Follow up with Dr. Aldridge or Dr. Lachman next week            Subjective   Patient ID: Amandeep Emanuel is a 43 y.o. male.    Vitals:    01/18/24 0938   Pulse: 85   SpO2: 96%     44yo male  comes in for an evaluation of his left ankle.  While at the gym this morning (~30 mins ago), he was sitting with a large dumbbell on his knee.  He kicked his leg up to start a shoulder press and felt a sudden pop in the anterior ankle.  He was unable to walk immediately thereafter and is unable to dorsiflex the ankle.  The pain is dull in character, mild in severity, pain does not radiate and is not associated with numbness.  He has a history of a left lateral ankle repair by Dr. Live at [facility] several years ago.          The following portions of the patient's history were reviewed and updated as appropriate: allergies, current medications, past family history, past medical history, past social history, past surgical history, and problem list.    Review of Systems  Ortho Exam  Past Medical History:   Diagnosis Date    DVT (deep venous thrombosis) (HCC)     left     SVT (supraventricular tachycardia)      Past Surgical History:   Procedure Laterality Date    ABLATION OF DYSRHYTHMIC FOCUS      BACK SURGERY      KNEE SURGERY      SPINAL FUSION       History reviewed. No pertinent family history.  Social History     Occupational History    Not on file   Tobacco Use    Smoking status: Never     Passive exposure: Past    Smokeless tobacco: Former     Types: Chew   Vaping Use    Vaping status: Never Used   Substance and Sexual Activity    Alcohol use: No    Drug use: Never    Sexual activity: Not on file       Review of Systems   Constitutional: Negative.    HENT: Negative.    Eyes: Negative.     Respiratory: Negative.    Cardiovascular: Negative.    Gastrointestinal: Negative.    Endocrine: Negative.    Genitourinary: Negative.    Musculoskeletal: As below..   Allergic/Immunologic: Negative.    Neurological: Negative.    Hematological: Negative.    Psychiatric/Behavioral: Negative.          Objective   Physical Exam        I have personally reviewed pertinent films in PACS and my interpretation is no acute displaced fracture on xray.  Osteophyte noted at the tibialis anterior insertion.      Constitutional: Awake, Alert, Oriented  Eyes: EOMI  Psych: Mood and affect appropriate  Heart: regular rate   Lungs: No audible wheezing  Abdomen: No guarding  Lymph: no lymphedema            left ankle:  Appearance  Fullness noted in the anterolateral ankle area.  + swelling.  No ecchymosis.  Palpation  + tenderness anterior ankle and dorsal midfoot  Non-tender achilles, calcaneus, malleoli, deltoid, metatarsals  ROM  Dorsiflexion: 0 and Plantarflexion: 40  Motor  3 on 5 ankle dorsiflexion and 5 of 5 ankle plantarflexion  NVI distally, good perfusion

## 2024-01-23 ENCOUNTER — OFFICE VISIT (OUTPATIENT)
Dept: OBGYN CLINIC | Facility: CLINIC | Age: 44
End: 2024-01-23
Payer: COMMERCIAL

## 2024-01-23 VITALS
WEIGHT: 275 LBS | SYSTOLIC BLOOD PRESSURE: 171 MMHG | HEIGHT: 76 IN | BODY MASS INDEX: 33.49 KG/M2 | HEART RATE: 88 BPM | DIASTOLIC BLOOD PRESSURE: 100 MMHG

## 2024-01-23 DIAGNOSIS — R29.898 ANKLE WEAKNESS: ICD-10-CM

## 2024-01-23 DIAGNOSIS — S96.912A ANKLE STRAIN, LEFT, INITIAL ENCOUNTER: Primary | ICD-10-CM

## 2024-01-23 PROCEDURE — 99214 OFFICE O/P EST MOD 30 MIN: CPT | Performed by: ORTHOPAEDIC SURGERY

## 2024-01-23 NOTE — PROGRESS NOTES
James R Lachman, M.D.  Attending, Orthopaedic Surgery  Foot and Ankle  [facility]      ORTHOPAEDIC FOOT AND ANKLE CLINIC VISIT     Assessment:     Encounter Diagnoses   Name Primary?    Ankle strain, left, initial encounter Yes    Ankle weakness             Plan:   The patient verbalized understanding of exam findings and treatment plan. We engaged in the shared decision-making process and treatment options were discussed at length with the patient. Surgical and conservative management discussed today along with risks and benefits.  Patient felt a pop on the anterior aspect of his left ankle while at the gym on 1/18/23. He was unloading dumbbells off his knee while seated.  There was concern at that time for a tibialis anterior tendon tear but his MRI is overall normal.  WBAT in supportive shoe- may wean the boot  He has Mild edema at extensor digitorum brevis tendon and an osteophyte in the dorsal talar neck. Maybe this tendon snapped over the osteophyte and that was the pop he experienced  Begin PT/ HEP immediately   Activity modification, OTC pain meds, ice, and elevation for pain control prn  Compression socks for swelling control prn  Return in about 6 weeks (around 3/5/2024).      History of Present Illness:   Chief Complaint:   Chief Complaint   Patient presents with    Left Ankle - Pain     Has cam boot on. Sharp pain. Bothers him standing and walking.      Amandeep Emanuel is a 43 y.o. male who is being seen for left anterior ankle pain. Patient states that he was at the gym, with dumbbells on his knees, when he push up on his feel to start a shoulder press when he felt a pop in the anterior ankle pain. Once he went to walk he felt immediate pain and difficulty ambulating. He went to [facility] who placed him into a cam boot WBAT and obtain an MRI. Since his injury, he has been wearing the boot while out and states his pain has moderatetly improved.  Pain is localized at anterior ankle  with minimal radiating and described as sharp and severe. Patient denies numbness, tingling or radicular pain.  Denies history of neuropathy.  Patient does not smoke, does no have diabetes and does not take blood thinners.  Patient denies family history of anesthesia complications and has not had any complications with anesthesia.     Pain/symptom timing:  Worse during the day when active  Pain/symptom context:  Worse with activites and work  Pain/symptom modifying factors:  Rest makes better, activities make worse  Pain/symptom associated signs/symptoms: none    Prior treatment   NSAIDsYes   Injections No   Bracing/Orthotics Yes    Physical Therapy No     Orthopedic Surgical History:   See below    Past Medical, Surgical and Social History:  Past Medical History:  has a past medical history of DVT (deep venous thrombosis) (HCC) and SVT (supraventricular tachycardia).  Problem List: does not have any pertinent problems on file.  Past Surgical History:  has a past surgical history that includes Knee surgery; Back surgery; Ablation of dysrhythmic focus; and Spinal fusion.  Family History: family history is not on file.  Social History:  reports that he has never smoked. He has been exposed to tobacco smoke. He has quit using smokeless tobacco.  His smokeless tobacco use included chew. He reports that he does not drink alcohol and does not use drugs.  Current Medications: has a current medication list which includes the following prescription(s): albuterol and fluticasone-salmeterol.  Allergies: has No Known Allergies.     Review of Systems:  General- denies fever/chills  HEENT- denies hearing loss or sore throat  Eyes- denies eye pain or visual disturbances, denies red eyes  Respiratory- denies cough or SOB  Cardio- denies chest pain or palpitations  GI- denies abdominal pain  Endocrine- denies urinary frequency  Urinary- denies pain with urination  Musculoskeletal- Negative except noted above  Skin- denies rashes or  "wounds  Neurological- denies dizziness or headache  Psychiatric- denies anxiety or difficulty concentrating    Physical Exam:   BP (!) 171/100   Pulse 88   Ht 6' 4\" (1.93 m)   Wt 125 kg (275 lb)   BMI 33.47 kg/m²   General/Constitutional: No apparent distress: well-nourished and well developed.  Eyes: normal ocular motion  Cardio: RRR, Normal S1S2, No m/r/g  Lymphatic: No appreciable lymphadenopathy  Respiratory: Non-labored breathing, CTA b/l no w/c/r  Vascular: No edema, swelling or tenderness, except as noted in detailed exam.  Integumentary: No impressive skin lesions present, except as noted in detailed exam.  Neuro: No ataxia or tremors noted  Psych: Normal mood and affect, oriented to person, place and time. Appropriate affect.  Musculoskeletal: Normal, except as noted in detailed exam and in HPI.    Examination    Left    Gait Normal and Antalgic   Musculoskeletal Tender to palpation at anterior ankle, dorsal midfoot    Skin Normal.      Nails Normal    Range of Motion  20 degrees dorsiflexion, 40 degrees plantarflexion  Subtalar motion: normal    Stability Stable    Muscle Strength 5/5 tibialis anterior  5/5 gastrocnemius-soleus  5/5 posterior tibialis  5/5 peroneal/eversion strength  5/5 EHL  5/5 FHL    Neurologic Normal    Sensation Intact to light touch throughout sural, saphenous, superficial peroneal, deep peroneal and medial/lateral plantar nerve distributions.  Salisbury-Martinez 5.07 filament (10g) testing  deferred.    Cardiovascular Brisk capillary refill < 2 seconds,intact DP and PT pulses    Special Tests None      Imaging Studies:   3 views of the left ankle were taken, reviewed and interpreted independently that demonstrate no acute osseous abnormalities. Dorsal osteophyte on talus. Reviewed by me personally.    MRI of left ankle was available for review from 1/18/24 which demonstrates no acute osseous abnormalities. Mild edema at extensor digitorum brevis. Reviewed by me " personally.    Scribe Attestation      I,:  David Fisher PA-C am acting as a scribe while in the presence of the attending physician.:       I,:  James R Lachman, MD personally performed the services described in this documentation    as scribed in my presence.:               James R. Lachman, MD  Foot & Ankle Surgery   Department of Orthopaedic Surgery  [facility]      I personally performed the service.    James R. Lachman, MD

## 2024-01-26 ENCOUNTER — TELEPHONE (OUTPATIENT)
Dept: OBGYN CLINIC | Facility: HOSPITAL | Age: 44
End: 2024-01-26

## 2024-01-26 NOTE — TELEPHONE ENCOUNTER
Caller: Patient    Doctor: Lachman    Reason for call: Patient's employer asking for a work status letter (not WC they just need a letter that he can work with no restrictions).  Please place in [employer].    Call back#: [phone]

## 2024-03-04 ENCOUNTER — TELEPHONE (OUTPATIENT)
Age: 44
End: 2024-03-04

## 2024-03-04 ENCOUNTER — APPOINTMENT (EMERGENCY)
Dept: RADIOLOGY | Facility: HOSPITAL | Age: 44
End: 2024-03-04
Payer: OTHER MISCELLANEOUS

## 2024-03-04 ENCOUNTER — APPOINTMENT (EMERGENCY)
Dept: CT IMAGING | Facility: HOSPITAL | Age: 44
End: 2024-03-04
Payer: OTHER MISCELLANEOUS

## 2024-03-04 ENCOUNTER — HOSPITAL ENCOUNTER (EMERGENCY)
Facility: HOSPITAL | Age: 44
Discharge: HOME | End: 2024-03-04
Attending: EMERGENCY MEDICINE | Admitting: EMERGENCY MEDICINE
Payer: OTHER MISCELLANEOUS

## 2024-03-04 VITALS
HEART RATE: 81 BPM | WEIGHT: 270 LBS | DIASTOLIC BLOOD PRESSURE: 69 MMHG | OXYGEN SATURATION: 97 % | RESPIRATION RATE: 17 BRPM | SYSTOLIC BLOOD PRESSURE: 127 MMHG | HEIGHT: 76 IN | BODY MASS INDEX: 32.88 KG/M2 | TEMPERATURE: 97.8 F

## 2024-03-04 DIAGNOSIS — S80.01XA CONTUSION OF RIGHT KNEE, INITIAL ENCOUNTER: Primary | ICD-10-CM

## 2024-03-04 DIAGNOSIS — T14.8XXA HEMATOMA: ICD-10-CM

## 2024-03-04 LAB
ANION GAP SERPL CALCULATED.3IONS-SCNC: 7 MMOL/L
BASOPHILS # BLD AUTO: 0.06 THOUSANDS/ÂΜL (ref 0–0.1)
BASOPHILS NFR BLD AUTO: 1 % (ref 0–1)
BUN SERPL-MCNC: 16 MG/DL (ref 5–25)
CALCIUM SERPL-MCNC: 8.9 MG/DL (ref 8.4–10.2)
CHLORIDE SERPL-SCNC: 103 MMOL/L (ref 96–108)
CO2 SERPL-SCNC: 25 MMOL/L (ref 21–32)
CREAT SERPL-MCNC: 1.15 MG/DL (ref 0.6–1.3)
EOSINOPHIL # BLD AUTO: 0.11 THOUSAND/ÂΜL (ref 0–0.61)
EOSINOPHIL NFR BLD AUTO: 1 % (ref 0–6)
ERYTHROCYTE [DISTWIDTH] IN BLOOD BY AUTOMATED COUNT: 12.8 % (ref 11.6–15.1)
GFR SERPL CREATININE-BSD FRML MDRD: 77 ML/MIN/1.73SQ M
GLUCOSE SERPL-MCNC: 101 MG/DL (ref 65–140)
HCT VFR BLD AUTO: 48.4 % (ref 36.5–49.3)
HGB BLD-MCNC: 16.8 G/DL (ref 12–17)
IMM GRANULOCYTES # BLD AUTO: 0.09 THOUSAND/UL (ref 0–0.2)
IMM GRANULOCYTES NFR BLD AUTO: 1 % (ref 0–2)
LYMPHOCYTES # BLD AUTO: 1.71 THOUSANDS/ÂΜL (ref 0.6–4.47)
LYMPHOCYTES NFR BLD AUTO: 16 % (ref 14–44)
MCH RBC QN AUTO: 32.7 PG (ref 26.8–34.3)
MCHC RBC AUTO-ENTMCNC: 34.7 G/DL (ref 31.4–37.4)
MCV RBC AUTO: 94 FL (ref 82–98)
MONOCYTES # BLD AUTO: 0.79 THOUSAND/ÂΜL (ref 0.17–1.22)
MONOCYTES NFR BLD AUTO: 8 % (ref 4–12)
NEUTROPHILS # BLD AUTO: 7.8 THOUSANDS/ÂΜL (ref 1.85–7.62)
NEUTS SEG NFR BLD AUTO: 73 % (ref 43–75)
NRBC BLD AUTO-RTO: 0 /100 WBCS
PLATELET # BLD AUTO: 287 THOUSANDS/UL (ref 149–390)
PMV BLD AUTO: 9.6 FL (ref 8.9–12.7)
POTASSIUM SERPL-SCNC: 4 MMOL/L (ref 3.5–5.3)
RBC # BLD AUTO: 5.14 MILLION/UL (ref 3.88–5.62)
SODIUM SERPL-SCNC: 135 MMOL/L (ref 135–147)
WBC # BLD AUTO: 10.56 THOUSAND/UL (ref 4.31–10.16)

## 2024-03-04 PROCEDURE — 96374 THER/PROPH/DIAG INJ IV PUSH: CPT

## 2024-03-04 PROCEDURE — 80048 BASIC METABOLIC PNL TOTAL CA: CPT | Performed by: PHYSICIAN ASSISTANT

## 2024-03-04 PROCEDURE — 73701 CT LOWER EXTREMITY W/DYE: CPT

## 2024-03-04 PROCEDURE — 99285 EMERGENCY DEPT VISIT HI MDM: CPT | Performed by: PHYSICIAN ASSISTANT

## 2024-03-04 PROCEDURE — 85025 COMPLETE CBC W/AUTO DIFF WBC: CPT | Performed by: PHYSICIAN ASSISTANT

## 2024-03-04 PROCEDURE — 73564 X-RAY EXAM KNEE 4 OR MORE: CPT

## 2024-03-04 PROCEDURE — 99284 EMERGENCY DEPT VISIT MOD MDM: CPT

## 2024-03-04 PROCEDURE — 36415 COLL VENOUS BLD VENIPUNCTURE: CPT | Performed by: PHYSICIAN ASSISTANT

## 2024-03-04 RX ORDER — ACETAMINOPHEN 325 MG/1
975 TABLET ORAL ONCE
Status: COMPLETED | OUTPATIENT
Start: 2024-03-04 | End: 2024-03-04

## 2024-03-04 RX ORDER — KETOROLAC TROMETHAMINE 30 MG/ML
15 INJECTION, SOLUTION INTRAMUSCULAR; INTRAVENOUS ONCE
Status: COMPLETED | OUTPATIENT
Start: 2024-03-04 | End: 2024-03-04

## 2024-03-04 RX ADMIN — KETOROLAC TROMETHAMINE 15 MG: 30 INJECTION, SOLUTION INTRAMUSCULAR; INTRAVENOUS at 10:08

## 2024-03-04 RX ADMIN — ACETAMINOPHEN 975 MG: 325 TABLET, FILM COATED ORAL at 10:07

## 2024-03-04 RX ADMIN — IOHEXOL 100 ML: 350 INJECTION, SOLUTION INTRAVENOUS at 11:03

## 2024-03-04 NOTE — TELEPHONE ENCOUNTER
Hello,  Please advise if the following patient can be forced onto the schedule:    Patient: Amandeep Emanuel    : 1980    MRN: [id number]    Call back #: [phone]     Insurance:  - DOI 3/4/24- Patient does not have  information yet.    Reason for appointment: Patient scheduled for soonest available with  3/18 with Dr. Lord.  Ortho POD clinical suggested he may need to be seen sooner because of possible abscess noted on 3/4 CT.      Requested doctor/location: Patient requested [place] location.      Thank you.

## 2024-03-04 NOTE — ED PROVIDER NOTES
History  Chief Complaint   Patient presents with    Knee Injury     Pt presents to ed via walk in with complaint of right knee injury while at work got his foot hooked on a strap and fell onto his right knee red and swollen at this time      Past Medical History: Left DVT, SVT (supraventricular tachycardia)   Past Surgical History: ABLATION OF DYSRHYTHMIC FOCUS, BACK SURGERY, KNEE SURGERY, SPINAL FUSION     Pt presents to ED c/o right knee pain/swelling/worsening redness since his foot got caught on a strap, twisted and then he fell onto knee cap/side of leg.  Wife/pt concerned about worsening redness noted along lateral aspect, possible infection  Patient denies fever, skin breaks, CP, SOB          None       Past Medical History:   Diagnosis Date    DVT (deep venous thrombosis) (HCC)     left     SVT (supraventricular tachycardia)        Past Surgical History:   Procedure Laterality Date    ABLATION OF DYSRHYTHMIC FOCUS      BACK SURGERY      KNEE SURGERY      SPINAL FUSION         History reviewed. No pertinent family history.  I have reviewed and agree with the history as documented.    E-Cigarette/Vaping    E-Cigarette Use Never User      E-Cigarette/Vaping Substances    Nicotine No     THC No     CBD No     Flavoring No     Other No     Unknown No      Social History     Tobacco Use    Smoking status: Never     Passive exposure: Past    Smokeless tobacco: Former     Types: Chew   Vaping Use    Vaping status: Never Used   Substance Use Topics    Alcohol use: No    Drug use: Never       Review of Systems   Constitutional:  Negative for fever.   HENT:  Negative for sore throat.    Respiratory:  Negative for cough and shortness of breath.    Cardiovascular:  Negative for chest pain and leg swelling.   Gastrointestinal:  Negative for abdominal pain, nausea and vomiting.   Genitourinary:  Negative for dysuria and frequency.   Musculoskeletal:  Positive for arthralgias, gait problem, joint swelling and myalgias.    Skin:  Positive for color change. Negative for pallor and wound.   Neurological:  Negative for dizziness, weakness and headaches.   Psychiatric/Behavioral:  Negative for behavioral problems.    All other systems reviewed and are negative.      Physical Exam  Physical Exam  Vitals and nursing note reviewed.   Constitutional:       General: He is in acute distress (mild).      Appearance: He is well-developed.   HENT:      Head: Normocephalic and atraumatic.      Right Ear: External ear normal.      Left Ear: External ear normal.      Nose: Nose normal.      Mouth/Throat:      Mouth: Mucous membranes are moist.      Pharynx: Oropharynx is clear.   Eyes:      Conjunctiva/sclera: Conjunctivae normal.   Cardiovascular:      Rate and Rhythm: Normal rate and regular rhythm.   Pulmonary:      Effort: Pulmonary effort is normal. No respiratory distress.   Musculoskeletal:         General: Swelling, tenderness and signs of injury present. Normal range of motion.      Cervical back: Normal range of motion.      Comments: RLE: Mild diffuse tenderness, swelling, effusion noted to right knee over patella, diffuse erythema noted laterally likely hematoma, outlined in pen-this did improve throughout emergency department evaluation, mild warmth  Decreased range of motion to full flexion, does neurovascular intact, no calf pain, skin intact    Skin:     General: Skin is warm and dry.      Findings: Erythema present.   Neurological:      Mental Status: He is alert and oriented to person, place, and time.   Psychiatric:         Behavior: Behavior normal.         Vital Signs  ED Triage Vitals   Temperature Pulse Respirations Blood Pressure SpO2   03/04/24 0852 03/04/24 0852 03/04/24 0852 03/04/24 0915 03/04/24 0852   97.8 °F (36.6 °C) 87 16 131/72 96 %      Temp Source Heart Rate Source Patient Position - Orthostatic VS BP Location FiO2 (%)   03/04/24 0852 03/04/24 0852 -- 03/04/24 0852 --   Oral Monitor  Right arm       Pain Score        03/04/24 0852       7           Vitals:    03/04/24 0852 03/04/24 0915 03/04/24 1157   BP:  131/72 127/69   Pulse: 87  81         Visual Acuity      ED Medications  Medications   ketorolac (TORADOL) injection 15 mg (15 mg Intravenous Given 3/4/24 1008)   acetaminophen (TYLENOL) tablet 975 mg (975 mg Oral Given 3/4/24 1007)   iohexol (OMNIPAQUE) 350 MG/ML injection (SINGLE-DOSE) 100 mL (100 mL Intravenous Given 3/4/24 1103)       Diagnostic Studies  Results Reviewed       Procedure Component Value Units Date/Time    Basic metabolic panel [[id number]] Collected: 03/04/24 1007    Lab Status: Final result Specimen: Blood from Arm, Right Updated: 03/04/24 1044     Sodium 135 mmol/L      Potassium 4.0 mmol/L      Chloride 103 mmol/L      CO2 25 mmol/L      ANION GAP 7 mmol/L      BUN 16 mg/dL      Creatinine 1.15 mg/dL      Glucose 101 mg/dL      Calcium 8.9 mg/dL      eGFR 77 ml/min/1.73sq m     Narrative:      National Kid[employer] Foundation guidelines for Chronic Kidney Disease (CKD):     Stage 1 with normal or high GFR (GFR > 90 mL/min/1.73 square meters)    Stage 2 Mild CKD (GFR = 60-89 mL/min/1.73 square meters)    Stage 3A Moderate CKD (GFR = 45-59 mL/min/1.73 square meters)    Stage 3B Moderate CKD (GFR = 30-44 mL/min/1.73 square meters)    Stage 4 Severe CKD (GFR = 15-29 mL/min/1.73 square meters)    Stage 5 End Stage CKD (GFR <15 mL/min/1.73 square meters)  Note: GFR calculation is accurate only with a steady state creatinine    CBC and differential [[id number]]  (Abnormal) Collected: 03/04/24 1007    Lab Status: Final result Specimen: Blood from Arm, Right Updated: 03/04/24 1016     WBC 10.56 Thousand/uL      RBC 5.14 Million/uL      Hemoglobin 16.8 g/dL      Hematocrit 48.4 %      MCV 94 fL      MCH 32.7 pg      MCHC 34.7 g/dL      RDW 12.8 %      MPV 9.6 fL      Platelets 287 Thousands/uL      nRBC 0 /100 WBCs      Neutrophils Relative 73 %      Immat GRANS % 1 %      Lymphocytes Relative 16 %       Monocytes Relative 8 %      Eosinophils Relative 1 %      Basophils Relative 1 %      Neutrophils Absolute 7.80 Thousands/µL      Immature Grans Absolute 0.09 Thousand/uL      Lymphocytes Absolute 1.71 Thousands/µL      Monocytes Absolute 0.79 Thousand/µL      Eosinophils Absolute 0.11 Thousand/µL      Basophils Absolute 0.06 Thousands/µL                    CT lower extremity w contrast right   Final Result by Willian Lubin MD (03/04 1157)      Thick-walled prepatellar collection measuring 3.9 x 0.8 x 7.5 cm, possibly a hematoma or fluid collection. No evidence of active extravasation. Abscess cannot be excluded on the basis of imaging.      Nonspecific anterolateral subcutaneous edema in the knee and visualized lower leg.      No acute osseous abnormality.      Workstation performed: [id number]         XR knee 4+ vw right injury   ED Interpretation by Dorita Roberto PA-C (03/04 0927)   No fx      Final Result by Teddy Fuller MD (03/04 1049)      Soft tissue swelling anteriorly, without evidence of fracture.      There is narrowing of the medial aspect of the knee joint            Workstation performed: [id number]                    Procedures  Procedures         ED Course  ED Course as of 03/04/24 1954   Mon Mar 04, 2024   1018 CBC unremarkable   1054 BMP unremarkable                               SBIRT 22yo+      Flowsheet Row Most Recent Value   Initial Alcohol Screen: US AUDIT-C     1. How often do you have a drink containing alcohol? 0 Filed at: 03/04/2024 0856   2. How many drinks containing alcohol do you have on a typical day you are drinking?  0 Filed at: 03/04/2024 0856   3a. Male UNDER 65: How often do you have five or more drinks on one occasion? 0 Filed at: 03/04/2024 0856   3b. FEMALE Any Age, or MALE 65+: How often do you have 4 or more drinks on one occassion? 0 Filed at: 03/04/2024 0856   Audit-C Score 0 Filed at: 03/04/2024 0856   DAVID: How many times in the past year have you...     Used an illegal drug or used a prescription medication for non-medical reasons? Never Filed at: 03/04/2024 0856                      Medical Decision Making  Patient with blunt trauma to right knee now with pain, swelling, warmth, redness wife very concerned about possible infection.  Will check labs, CAT scan  Labs CAT scan reassuring symptoms likely hematoma, as onset of symptoms was few hours, no fever  Ace wrap/Knee immobilizer, pt states has his own crutches at home    Amount and/or Complexity of Data Reviewed  Labs: ordered. Decision-making details documented in ED Course.  Radiology: ordered and independent interpretation performed. Decision-making details documented in ED Course.    Risk  OTC drugs.  Prescription drug management.             Disposition  Final diagnoses:   Contusion of right knee, initial encounter   Hematoma - r knee     Time reflects when diagnosis was documented in both MDM as applicable and the Disposition within this note       Time User Action Codes Description Comment    3/4/2024 12:15 PM Dorita Roberto Add [S80.01XA] Contusion of right knee, initial encounter     3/4/2024 12:15 PM Dorita Roberto Add [T14.8XXA] Hematoma     3/4/2024 12:15 PM Dorita Roberto Modify [T14.8XXA] Hematoma r knee          ED Disposition       ED Disposition   Discharge    Condition   Stable    Date/Time   Mon Mar 4, 2024 1215    Comment   Amandeep Emanuel discharge to home/self care.                   Follow-up Information       Follow up With Specialties Details Why Contact Info Additional Information    Juju Santamaria MD Internal Medicine   [address]   [address]  [facility] [address] [phone]       [facility]   [address] [place] [id number] [phone] [facility] [address] [phone]            There are no discharge medications for this  patient.      No discharge procedures on file.    PDMP Review       None            ED Provider  Electronically Signed by             Dorita Roberto PA-C  03/04/24 1954

## 2024-03-04 NOTE — DISCHARGE INSTRUCTIONS
Use Ace wrap, knee immobilizer for next few days for comfort, taking off to bathe or sleep or until follow-up.     Use Tylenol 650 mg every 4 hours or Anti-inflammatories like Advil, Motrin, Ibuprofen, Aleve every 6 hours; you can alternate the 2 medications taking something every 3 hours for pain.      Follow-up with orthopedic doctor in the next few days if no improvement in condition.

## 2024-03-11 ENCOUNTER — OFFICE VISIT (OUTPATIENT)
Dept: OBGYN CLINIC | Facility: CLINIC | Age: 44
End: 2024-03-11
Payer: OTHER MISCELLANEOUS

## 2024-03-11 VITALS — WEIGHT: 270 LBS | BODY MASS INDEX: 32.88 KG/M2 | HEIGHT: 76 IN

## 2024-03-11 DIAGNOSIS — M23.91 INTERNAL DERANGEMENT OF RIGHT KNEE: Primary | ICD-10-CM

## 2024-03-11 DIAGNOSIS — S83.104A ACUTE TRAUMATIC INTERNAL DERANGEMENT OF RIGHT KNEE, INITIAL ENCOUNTER: ICD-10-CM

## 2024-03-11 PROCEDURE — 99214 OFFICE O/P EST MOD 30 MIN: CPT | Performed by: STUDENT IN AN ORGANIZED HEALTH CARE EDUCATION/TRAINING PROGRAM

## 2024-03-11 NOTE — PROGRESS NOTES
Orthopaedics Office Visit - new Patient Visit    ASSESSMENT/PLAN:    Assessment:   Right knee contusion, date of injury 3/4/2024  Internal derangement of right knee with possible lateral meniscus injury, traumatic    Plan:   Patient had x-rays and CT evaluation of the right knee which did not demonstrate any bony fractures or dislocations.  The patient has physical exam findings suggestive of a right lateral meniscus injury.  Given the traumatic nature of this injury, his inability to work due to continued pain despite anti-inflammatory medications, mild knee effusion as seen on exam we will be ordering an MRI of the right knee to fully visualize the injury to the right knee prior to initiating either corticosteroid or other interventional treatments.  X-rays reviewed and discussed with patient revealing soft tissue swelling anteriorly of the right knee without evidence of acute fracture or dislocation.  Pt to be weightbearing as tolerated  to right lower extremity  ROM as tolerated to right lower extremity   Discussed with patient further evaluation of right knee with MRI to further evaluate for internal derangement  A work note was written for the next 2 weeks to allow for inflammation resolution and MRI  Pt to continue at home analgesic regimen with Tylenol and advil   Pt to follow up for MRI review       _____________________________________________________  CHIEF COMPLAINT:  Chief Complaint   Patient presents with    Right Knee - Pain         SUBJECTIVE:  Amandeep Emanuel is a 43 y.o. male who presents with right knee pain. He states he was at work when his foot got caught on a strap while carrying 100 pound item causing him to fall, twist and land onto the right knee. He complained of swelling and redness after this event and was seen in the emergency department on 3/4/2024 to which she was diagnosed with a contusion of the right knee with associated hematoma. He states most of his pain is on the lateral aspect  "of the knee. His pain is exacerbated by movement and relieved by rest. He has tried advil with no relief of symptoms.  Patient has history of a prepatellar bursectomy performed approximately 15 years ago.  Patient has no subjective ligamentous laxity of the right knee.  His swelling has resolved over the lateral aspect of the knee but he still has specific pain over the lateral joint line.  He denies any numbness or paresthesias of the right knee.  Denies any pain in the right knee prior to his injury.  States has had decreased range of motion of the right knee since his injury    PAST MEDICAL HISTORY:  Past Medical History:   Diagnosis Date    DVT (deep venous thrombosis) (HCC)     left     SVT (supraventricular tachycardia)        PAST SURGICAL HISTORY:  Past Surgical History:   Procedure Laterality Date    ABLATION OF DYSRHYTHMIC FOCUS      BACK SURGERY      KNEE SURGERY      SPINAL FUSION         FAMILY HISTORY:  History reviewed. No pertinent family history.    SOCIAL HISTORY:  Social History     Tobacco Use    Smoking status: Never     Passive exposure: Past    Smokeless tobacco: Former     Types: Chew   Vaping Use    Vaping status: Never Used   Substance Use Topics    Alcohol use: No    Drug use: Never       MEDICATIONS:  No current outpatient medications on file.    ALLERGIES:  No Known Allergies    REVIEW OF SYSTEMS:  MSK: right knee pain   Neuro: none  Pertinent items are otherwise noted in HPI.  A comprehensive review of systems was otherwise negative.    LABS:  HgA1c: No results found for: \"HGBA1C\"  BMP:   Lab Results   Component Value Date    GLUCOSE 121 06/24/2014    CALCIUM 8.9 03/04/2024     (L) 06/24/2014    K 4.0 03/04/2024    CO2 25 03/04/2024     03/04/2024    BUN 16 03/04/2024    CREATININE 1.15 03/04/2024     CBC: No components found for: \"CBC\"    _____________________________________________________  PHYSICAL EXAMINATION:  Vital signs: Ht 6' 4\" (1.93 m)   Wt 122 kg (270 lb)   BMI " 32.87 kg/m²   General: No acute distress, awake and alert  Psychiatric: Mood and affect appear appropriate  HEENT: Trachea Midline, No torticollis, no apparent facial trauma  Cardiovascular: No audible murmurs; Extremities appear perfused  Pulmonary: No audible wheezing or stridor  Skin: No open lesions; see further details (if any) below    MUSCULOSKELETAL EXAMINATION:  Extremities:  right lower extremity    The right lower extremity was exposed and inspected.  Patient's swelling over the right knee has resolved.  The patient has a small area of ecchymosis that is resolving over the anterior lateral tibia.  The area of ecchymosis is mildly tender and is not the main pain foci.. TTP over lateral aspect of the knee at the level of the joint line anteriorly.  No tenderness over the medial joint line.  No tenderness over the extensor mechanism.  Patient is able to perform straight leg raise.  Mild knee effusion.  Previous surgical scar on the anterior aspect of the knee is well-healed and sealed and nontender. Pt able to actively range knee from 0-90 degrees of flexion. Negative anterior drawer.  Negative posterior drawer knee stable to valgus stress with no pain elicited. Varus stress with no laxity.  Positive lateral Aramis.  Negative medial Aramis.  Sensation intact to superficial peroneal, deep peroneal, sural, saphenous, plantar nerve distributions. Motor intact to extensor hallux longus, tibialis anterior, gastrocnemius muscles, extensor mechanism intact. Limb is well perfused. Brisk capillary refill in all 5 digits. Compartments soft and compressible.       _____________________________________________________  STUDIES REVIEWED:  I personally reviewed the images and interpretation is as follows:  X-ray right knee reveals soft tissue swelling anteriorly of the right knee without evidence of acute fracture or dislocation.  Visualized ACL PCL LCL intact on CT scan.    PROCEDURES PERFORMED:  Thiago Russell  GUALBERTO Bonner

## 2024-03-11 NOTE — LETTER
March 11, 2024     Patient: Amandeep Emanuel  YOB: 1980  Date of Visit: 3/11/2024      To Whom it May Concern:    Amandeep Emanuel is under my professional care. Amandeep was seen in my office on 3/11/2024. Amandeep may return to work on 3/25/24 .    If you have any questions or concerns, please don't hesitate to call.         Sincerely,          Gaurav Rodriguez,         CC: No Recipients

## 2024-03-12 ENCOUNTER — TELEPHONE (OUTPATIENT)
Dept: OBGYN CLINIC | Facility: HOSPITAL | Age: 44
End: 2024-03-12

## 2024-03-12 NOTE — TELEPHONE ENCOUNTER
Caller: Casie ([employer])    Doctor: Surendra    Reason for call: [employer] is calling to try to see if they can get an updated work note if patient has restrictions and what they are. They are trying to see if they can accommodate patient working from home for a little bit.    FAX# [phone]      Call back#: [phone] Ext [phone]

## 2024-03-19 ENCOUNTER — HOSPITAL ENCOUNTER (OUTPATIENT)
Dept: MRI IMAGING | Facility: HOSPITAL | Age: 44
Discharge: HOME | End: 2024-03-19
Attending: STUDENT IN AN ORGANIZED HEALTH CARE EDUCATION/TRAINING PROGRAM
Payer: OTHER MISCELLANEOUS

## 2024-03-19 DIAGNOSIS — S83.104A ACUTE TRAUMATIC INTERNAL DERANGEMENT OF RIGHT KNEE, INITIAL ENCOUNTER: ICD-10-CM

## 2024-03-19 DIAGNOSIS — M23.91 INTERNAL DERANGEMENT OF RIGHT KNEE: ICD-10-CM

## 2024-03-19 PROCEDURE — 73721 MRI JNT OF LWR EXTRE W/O DYE: CPT

## 2024-03-26 ENCOUNTER — OFFICE VISIT (OUTPATIENT)
Dept: OBGYN CLINIC | Facility: CLINIC | Age: 44
End: 2024-03-26
Payer: OTHER MISCELLANEOUS

## 2024-03-26 VITALS — WEIGHT: 270 LBS | HEIGHT: 76 IN | BODY MASS INDEX: 32.88 KG/M2

## 2024-03-26 DIAGNOSIS — M70.41 PREPATELLAR BURSITIS OF RIGHT KNEE: ICD-10-CM

## 2024-03-26 DIAGNOSIS — S83.281A TEAR OF LATERAL MENISCUS OF RIGHT KNEE, CURRENT, UNSPECIFIED TEAR TYPE, INITIAL ENCOUNTER: Primary | ICD-10-CM

## 2024-03-26 PROCEDURE — 99214 OFFICE O/P EST MOD 30 MIN: CPT | Performed by: STUDENT IN AN ORGANIZED HEALTH CARE EDUCATION/TRAINING PROGRAM

## 2024-03-26 RX ORDER — NAPROXEN 500 MG/1
500 TABLET ORAL 2 TIMES DAILY WITH MEALS
Qty: 30 TABLET | Refills: 2 | Status: SHIPPED | OUTPATIENT
Start: 2024-03-26

## 2024-03-26 NOTE — LETTER
March 26, 2024     Patient: Amandeep Emanuel  YOB: 1980  Date of Visit: 3/26/2024      To Whom it May Concern:    Amandeep Emanuel is under my professional care. Amandeep was seen in my office on 3/26/2024. Amandeep should remain out of work until cleared by his surgeon.     If you have any questions or concerns, please don't hesitate to call.         Sincerely,          Gaurav Rodriguez,         CC: No Recipients

## 2024-03-26 NOTE — PROGRESS NOTES
Orthopaedics Office Visit - new Patient Visit    ASSESSMENT/PLAN:    Assessment:   Right knee contusion, date of injury 3/4/2024  Prepatellar bursitis  lateral meniscus root tear, traumatic    Plan:   MRI reviewed and discussed with patient revealing undersurface tear of the posterior root lateral meniscus  Pt to be weightbearing as tolerated  to right lower extremity  ROM as tolerated to right lower extremity   Discussed with patient surgical vs non surgical treatment with a corticosteroid injection versus arthroscopy. He states he is out of work and does not want to prolong his disability by trialing non surgical treatment. He would like to see a sports surgeon.   A work note was written to be out of work until seen again.  Referral to Dr Bourne for surgical consultation.   Pt to continue at home analgesic regimen with Tylenol and naproxen. A prescription for naproxen sent to his pharmacy.    F/u with Dr Bourne.       _____________________________________________________  CHIEF COMPLAINT:  Chief Complaint   Patient presents with    Right Knee - Pain, Clicking, Locking     Review MRI          SUBJECTIVE:  Amandeep Emanuel is a 44 y.o. male who presents with right knee pain. He states he was at work when his foot got caught on a strap while carrying 100 pound item causing him to fall, twist and land onto the right knee. He complained of swelling and redness after this event and was seen in the emergency department on 3/4/2024 to which she was diagnosed with a contusion of the right knee with associated hematoma. He states most of his pain is on the lateral aspect of the knee. His pain is exacerbated by movement and relieved by rest. He has tried advil with no relief of symptoms.  Patient has history of a prepatellar bursectomy performed approximately 15 years ago.  Patient has no subjective ligamentous laxity of the right knee.  His swelling has resolved over the lateral aspect of the knee but he still has specific  "pain over the lateral joint line.  He denies any numbness or paresthesias of the right knee.  Denies any pain in the right knee prior to his injury.  States has had decreased range of motion of the right knee since his injury    Interval history 3/26/2024  Amandeep is here for follow-up of his right knee pain.  He had an MRI and is here to review the results. He is still having significant pain. He has trouble sleeping at night. His pain is in the lateral knee. He has been out of work since the injury. He has been taking tylenol and advil with minimal relief. He denies any numbness or paresthesias.     PAST MEDICAL HISTORY:  Past Medical History:   Diagnosis Date    DVT (deep venous thrombosis) (HCC)     left     SVT (supraventricular tachycardia)        PAST SURGICAL HISTORY:  Past Surgical History:   Procedure Laterality Date    ABLATION OF DYSRHYTHMIC FOCUS      BACK SURGERY      KNEE SURGERY      SPINAL FUSION         FAMILY HISTORY:  History reviewed. No pertinent family history.    SOCIAL HISTORY:  Social History     Tobacco Use    Smoking status: Never     Passive exposure: Past    Smokeless tobacco: Former     Types: Chew   Vaping Use    Vaping status: Never Used   Substance Use Topics    Alcohol use: No    Drug use: Never       MEDICATIONS:  No current outpatient medications on file.    ALLERGIES:  No Known Allergies    REVIEW OF SYSTEMS:  MSK: right knee pain   Neuro: none  Pertinent items are otherwise noted in HPI.  A comprehensive review of systems was otherwise negative.    LABS:  HgA1c: No results found for: \"HGBA1C\"  BMP:   Lab Results   Component Value Date    GLUCOSE 121 06/24/2014    CALCIUM 8.9 03/04/2024     (L) 06/24/2014    K 4.0 03/04/2024    CO2 25 03/04/2024     03/04/2024    BUN 16 03/04/2024    CREATININE 1.15 03/04/2024     CBC: No components found for: \"CBC\"    _____________________________________________________  PHYSICAL EXAMINATION:  Vital signs: Ht 6' 4\" (1.93 m)   Wt 122 " kg (270 lb)   BMI 32.87 kg/m²   General: No acute distress, awake and alert  Psychiatric: Mood and affect appear appropriate  HEENT: Trachea Midline, No torticollis, no apparent facial trauma  Cardiovascular: No audible murmurs; Extremities appear perfused  Pulmonary: No audible wheezing or stridor  Skin: No open lesions; see further details (if any) below    MUSCULOSKELETAL EXAMINATION:  Extremities:  right lower extremity    The right lower extremity was exposed and inspected.  Patient's swelling over the right knee has resolved. He has a well healed scar over the anterior knee. He is TTP over lateral aspect of the knee at the level of the joint line.  No tenderness over the medial joint line.  No tenderness over the extensor mechanism.  Patient is able to perform straight leg raise.  Mild knee effusion. Pt able to actively range knee from 0-90 degrees of flexion. Negative anterior drawer.  Negative posterior drawer knee stable to valgus stress with no pain elicited. Varus stress with no laxity.  Positive lateral Aramis.  Negative medial Aramis.  Sensation intact to superficial peroneal, deep peroneal, sural, saphenous, plantar nerve distributions. Motor intact to extensor hallux longus, tibialis anterior, gastrocnemius muscles, extensor mechanism intact. Limb is well perfused. Brisk capillary refill in all 5 digits. Compartments soft and compressible.       _____________________________________________________  STUDIES REVIEWED:  I personally reviewed the images and interpretation is as follows:  .MRI reviewed and discussed with patient revealing undersurface tear of the posterior root lateral meniscus.  There was some bruising in his prepatellar bursa at the site of his previous prepatellar bursectomy.  ACL, PCL, MCL, LCL intact.  Medial meniscus intact    PROCEDURES PERFORMED:  Procedures none    Familia Muir MD

## 2024-03-27 ENCOUNTER — TELEPHONE (OUTPATIENT)
Age: 44
End: 2024-03-27

## 2024-03-27 NOTE — TELEPHONE ENCOUNTER
Caller: [email or web address]@ [email or web address]    Doctor: Steffen    Reason for call: Checking to see if injured worked has an appointment set up    Call back#: [phone]

## 2024-04-10 ENCOUNTER — ANESTHESIA EVENT (OUTPATIENT)
Dept: PERIOP | Facility: AMBULARY SURGERY CENTER | Age: 44
End: 2024-04-10
Payer: OTHER MISCELLANEOUS

## 2024-04-10 ENCOUNTER — OFFICE VISIT (OUTPATIENT)
Dept: OBGYN CLINIC | Facility: CLINIC | Age: 44
End: 2024-04-10
Payer: OTHER MISCELLANEOUS

## 2024-04-10 VITALS
WEIGHT: 270 LBS | HEART RATE: 90 BPM | DIASTOLIC BLOOD PRESSURE: 102 MMHG | SYSTOLIC BLOOD PRESSURE: 176 MMHG | BODY MASS INDEX: 32.88 KG/M2 | HEIGHT: 76 IN | RESPIRATION RATE: 18 BRPM

## 2024-04-10 DIAGNOSIS — S83.281A TEAR OF LATERAL MENISCUS OF RIGHT KNEE, CURRENT, UNSPECIFIED TEAR TYPE, INITIAL ENCOUNTER: ICD-10-CM

## 2024-04-10 PROCEDURE — 99214 OFFICE O/P EST MOD 30 MIN: CPT | Performed by: STUDENT IN AN ORGANIZED HEALTH CARE EDUCATION/TRAINING PROGRAM

## 2024-04-10 RX ORDER — CHLORHEXIDINE GLUCONATE ORAL RINSE 1.2 MG/ML
15 SOLUTION DENTAL ONCE
OUTPATIENT
Start: 2024-04-10 | End: 2024-04-10

## 2024-04-10 NOTE — PROGRESS NOTES
Ortho Sports Medicine Knee New Patient Visit     Assesment:   44 y.o. male right knee lateral meniscus tear sustained at work beginning of March 2024    Plan:  The patient's diagnosis and treatment were discussed at length today. We discussed no treatment, non-operative treatment, and operative treatment.    Amandeep presents today for initial evaluation right knee lateral meniscus tear.  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  He was in agreement with this plan.  I discussed with him that his MRI does demonstrate partial tear of the meniscal root, however the integrity of the root is unclear based on MRI.  Given this I discussed with him partial meniscectomy versus meniscal root repair.  I did review the recovery timeline of both procedures.  All risks and benefits were reviewed and surgical consent was signed for right knee arthroscopy with partial medial and/or lateral meniscectomy versus repair.  We did not provide him with a T ROM brace, however if we do a root repair we will provide him 1 at the date of surgery.  He can continue to form activity as tolerated using pain as a guide.  He is to follow-up with me approximately 5 to 7 days postoperatively.    He did also mention to me he has a history of a DVT several years ago that occurred after his cardiac ablation.  Because of this I recommend a course of Eliquis postoperatively, either 30 days for 6 weeks pending partial meniscectomy versus repair.  He is in agreement with this plan.    Given that the patient has failed conservative treatment and continues to have severe pain and/or dysfunction that limits their activities of daily living, they would like to proceed with operative intervention. We discussed with the patient the risks of no treatment, non-operative treatment, and operative  treatment. The risks of operative intervention were discussed and include but are not limited to: Infection, bleeding, stiffness, loss of range of motion, blood clot, failure of surgery, fracture, delayed union, nonunion, malunion, risk of potential future arthritis, continued problems with swelling, injury to surrounding structures/nerve/artery/vein, recurrence of cysts, failure of hardware, retained hardware and/or foreign body, symptomatic hardware, and continued instability, pain, dysfunction, or disability despite repair.       Conservative treatment:    Ice to knee for 20 minutes at least 1-2 times daily.  OTC NSAIDS prn for pain.  Let pain guide gradual return activities.  T ROM not provided at today's visit.    Imaging:    All imaging from today was reviewed by myself and explained to the patient.       Injection:    No Injection planned at this time.      Surgery:     All of the risks and benefits of operative treatment were explained to the patient, as well as the risks and benefits of any alternative treatment options, including nonoperative care. The risks of surgical treatement include, but are not limited to, infection, bleeding, blood clot, neurovascular damage, need for further surgery, continued pain, cardiovascular risk, and anesthesia risk.  The patient understood this and elects to proceed forward with surgical intervention.  We will proceed forward with right knee partial medial and/or lateral meniscectomy versus repair and all associated procedures.      Follow up:    Return for follow up 5-7 days post-op.        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  Work-related injury at the beginning of March when he states he was working on a roof carrying heavy equipment when he  twisted awkwardly and felt sharp pain and a pop in the knee.  He states during the injury he felt a pop along the lateral aspect of his knee followed by significant pain.  He does still report lateral knee pain as well as posterior knee pain with swelling.  He currently describes his pain as dull and achy and rates it a 4-10.  He states that deep squatting and twisting increases his symptoms.  He does occasionally get a clicking and catching sensation felt in his knee.  He does have a prior history of a right knee partial bursectomy 15+ years ago after repeated aspirations.  He is currently manages pain with ice and over-the-counter medication.  He denies any numbness or tingling.    Knee Surgical History:  Prior Right knee bursectomy 15 years ago    Past Medical, Social and Family History:  Past Medical History:   Diagnosis Date    DVT (deep venous thrombosis) (HCC)     left     SVT (supraventricular tachycardia)      Past Surgical History:   Procedure Laterality Date    ABLATION OF DYSRHYTHMIC FOCUS      BACK SURGERY      KNEE SURGERY      SPINAL FUSION       No Known Allergies  Current Outpatient Medications on File Prior to Visit   Medication Sig Dispense Refill    naproxen (EC NAPROSYN) 500 MG EC tablet Take 1 tablet (500 mg total) by mouth 2 (two) times a day with meals (Patient not taking: Reported on 4/10/2024) 30 tablet 2     No current facility-administered medications on file prior to visit.     Social History     Socioeconomic History    Marital status:      Spouse name: Not on file    Number of children: Not on file    Years of education: Not on file    Highest education level: Not on file   Occupational History    Not on file   Tobacco Use    Smoking status: Never     Passive exposure: Past    Smokeless tobacco: Former     Types: Chew   Vaping Use    Vaping status: Never Used   Substance and Sexual Activity    Alcohol use: No    Drug use: Never    Sexual activity: Not on file   Other Topics  "Concern    Not on file   Social History Narrative    Not on file     Social Determinants of Health     Financial Resource Strain: Not on file   Food Insecurity: Not on file   Transportation Needs: Not on file   Physical Activity: Not on file   Stress: Not on file   Social Connections: Not on file   Intimate Partner Violence: Not on file   Housing Stability: Not on file         I have reviewed the past medical, surgical, social and family history, medications and allergies as documented in the EMR.    Review of systems: ROS is negative other than that noted in the HPI.  Constitutional: Negative for fatigue and fever.   HENT: Negative for sore throat.    Respiratory: Negative for shortness of breath.    Cardiovascular: Negative for chest pain.   Gastrointestinal: Negative for abdominal pain.   Endocrine: Negative for cold intolerance and heat intolerance.   Genitourinary: Negative for flank pain.   Musculoskeletal: Negative for back pain.   Skin: Negative for rash.   Allergic/Immunologic: Negative for immunocompromised state.   Neurological: Negative for dizziness.   Psychiatric/Behavioral: Negative for agitation.      Physical Exam:    Blood pressure (!) 176/102, pulse 90, resp. rate 18, height 6' 4\" (1.93 m), weight 122 kg (270 lb).    General/Constitutional: NAD, well developed, well nourished  HENT: Normocephalic, atraumatic  CV: Intact distal pulses, regular rate  Resp: No respiratory distress or labored breathing  Lymphatic: No lymphadenopathy palpated  Neuro: Alert and Oriented x 3, no focal deficits  Psych: Normal mood, normal affect, normal judgement, normal behavior  Skin: Warm, dry, no rashes, no erythema      Knee Exam (focused):  Visual inspection of the Right knee demonstrates normal contour without atrophy.   Healed previous incisions   There is no significant erythema or edema.    Tracejoint effusion   Range of motion is full from 0-130 degrees of flexion   Able to straight leg raise   No bursal tender " to palpation  Minimal medial joint line tenderness, + lateral joint line tenderness  - medial Aramis's, + lateral Aramis's  1A Lachman exam, Stable posterior drawer  - dial test  Stable to varus and valgus stress at both 0 and 30°  Patella tracks normally.  No J sign.  No apprehension.  Translation is approximately 2 quadrants and is equal to the contralateral side  Patellar eversion is similar to the contralateral side    Examination of the patient's ipsilateral hip demonstrates full painless range of motion.  No crepitus.      LE NV Exam: +2 DP/PT pulses bilaterally  Sensation intact to light touch L2-S1 bilaterally     Bilateral hip ROM demonstrates no pain actively or passively    No calf tenderness to palpation bilaterally    Knee Imaging    X-rays of the right knee were reviewed, which demonstrate no acute osseous abnormalities with mild medial compartment osteoarthritis.  I have reviewed the radiology report and agree with their impression.    MRI of the right knee were reviewed, which demonstrate undersurface tearing posterior root lateral meniscus without flipped fragment.  Medial meniscus appears intact.  There is grade 3 cartilage changes along the mid trochlea as well as grade 2/grade 3 cartilage fissuring along the weightbearing aspect of the medial femoral condyle.  Lastly, there is evidence of complex collection around the prepatellar bursa suspicious of chronic hemorrhagic bursitis.  I have reviewed the radiology report and agree with their impression.      Scribe Attestation      I,:  Tre Aleman am acting as a scribe while in the presence of the attending physician.:       I,:  Armond Bourne, DO personally performed the services described in this documentation    as scribed in my presence.:

## 2024-04-10 NOTE — LETTER
April 10, 2024     Gaurav Rodriguez DO  [address]  [address]  [place] [address]    Patient: Amandeep Emanuel   YOB: 1980   Date of Visit: 4/10/2024       Dear Dr. Rodriguez:    Thank you for referring Amandeep Emanuel to me for evaluation. Below are my notes for this consultation.    If you have questions, please do not hesitate to call me. I look forward to following your patient along with you.         Sincerely,        Armond Bourne DO        CC: No Recipients    Armond Bourne DO  4/10/2024  9:42 AM  Sign when Signing Visit  Ortho Sports Medicine Knee New Patient Visit     Assesment:   44 y.o. male right knee lateral meniscus tear sustained at work beginning of March 2024    Plan:  The patient's diagnosis and treatment were discussed at length today. We discussed no treatment, non-operative treatment, and operative treatment.    Amandeep presents today for initial evaluation right knee lateral meniscus tear.  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  He was in agreement with this plan.  I discussed with him that his MRI does demonstrate partial tear of the meniscal root, however the integrity of the root is unclear based on MRI.  Given this I discussed with him partial meniscectomy versus meniscal root repair.  I did review the recovery timeline of both procedures.  All risks and benefits were reviewed and surgical consent was signed for right knee arthroscopy with partial medial and/or lateral meniscectomy versus repair.  We did not provide him with a T ROM brace, however if we do a root repair we will provide him 1 at the date of surgery.  He can continue to form activity as tolerated using pain as a guide.  He is to follow-up with me approximately 5 to 7 days postoperatively.    He did also mention to me he has a  history of a DVT several years ago that occurred after his cardiac ablation.  Because of this I recommend a course of Eliquis postoperatively, either 30 days for 6 weeks pending partial meniscectomy versus repair.  He is in agreement with this plan.    Given that the patient has failed conservative treatment and continues to have severe pain and/or dysfunction that limits their activities of daily living, they would like to proceed with operative intervention. We discussed with the patient the risks of no treatment, non-operative treatment, and operative treatment. The risks of operative intervention were discussed and include but are not limited to: Infection, bleeding, stiffness, loss of range of motion, blood clot, failure of surgery, fracture, delayed union, nonunion, malunion, risk of potential future arthritis, continued problems with swelling, injury to surrounding structures/nerve/artery/vein, recurrence of cysts, failure of hardware, retained hardware and/or foreign body, symptomatic hardware, and continued instability, pain, dysfunction, or disability despite repair.       Conservative treatment:    Ice to knee for 20 minutes at least 1-2 times daily.  OTC NSAIDS prn for pain.  Let pain guide gradual return activities.  T ROM not provided at today's visit.    Imaging:    All imaging from today was reviewed by myself and explained to the patient.       Injection:    No Injection planned at this time.      Surgery:     All of the risks and benefits of operative treatment were explained to the patient, as well as the risks and benefits of any alternative treatment options, including nonoperative care. The risks of surgical treatement include, but are not limited to, infection, bleeding, blood clot, neurovascular damage, need for further surgery, continued pain, cardiovascular risk, and anesthesia risk.  The patient understood this and elects to proceed forward with surgical intervention.  We will proceed  forward with right knee partial medial and/or lateral meniscectomy versus repair and all associated procedures.      Follow up:    Return for follow up 5-7 days post-op.        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  Work-related injury at the beginning of March when he states he was working on a roof carrying heavy equipment when he twisted awkwardly and felt sharp pain and a pop in the knee.  He states during the injury he felt a pop along the lateral aspect of his knee followed by significant pain.  He does still report lateral knee pain as well as posterior knee pain with swelling.  He currently describes his pain as dull and achy and rates it a 4-10.  He states that deep squatting and twisting increases his symptoms.  He does occasionally get a clicking and catching sensation felt in his knee.  He does have a prior history of a right knee partial bursectomy 15+ years ago after repeated aspirations.  He is currently manages pain with ice and over-the-counter medication.  He denies any numbness or tingling.    Knee Surgical History:  Prior Right knee bursectomy 15 years ago    Past Medical, Social and Family History:  Past Medical History:   Diagnosis Date   • DVT (deep venous thrombosis) (HCC)     left    • SVT (supraventricular tachycardia)      Past Surgical History:   Procedure Laterality Date   • ABLATION OF DYSRHYTHMIC FOCUS     • BACK SURGERY     • KNEE SURGERY     • SPINAL FUSION       No Known Allergies  Current Outpatient Medications on File Prior to Visit   Medication Sig Dispense Refill   • naproxen (EC NAPROSYN) 500 MG EC tablet Take 1 tablet (500 mg total) by mouth 2 (two) times a day with meals (Patient not taking: Reported on 4/10/2024) 30 tablet 2     No current  "facility-administered medications on file prior to visit.     Social History     Socioeconomic History   • Marital status:      Spouse name: Not on file   • Number of children: Not on file   • Years of education: Not on file   • Highest education level: Not on file   Occupational History   • Not on file   Tobacco Use   • Smoking status: Never     Passive exposure: Past   • Smokeless tobacco: Former     Types: Chew   Vaping Use   • Vaping status: Never Used   Substance and Sexual Activity   • Alcohol use: No   • Drug use: Never   • Sexual activity: Not on file   Other Topics Concern   • Not on file   Social History Narrative   • Not on file     Social Determinants of Health     Financial Resource Strain: Not on file   Food Insecurity: Not on file   Transportation Needs: Not on file   Physical Activity: Not on file   Stress: Not on file   Social Connections: Not on file   Intimate Partner Violence: Not on file   Housing Stability: Not on file         I have reviewed the past medical, surgical, social and family history, medications and allergies as documented in the EMR.    Review of systems: ROS is negative other than that noted in the HPI.  Constitutional: Negative for fatigue and fever.   HENT: Negative for sore throat.    Respiratory: Negative for shortness of breath.    Cardiovascular: Negative for chest pain.   Gastrointestinal: Negative for abdominal pain.   Endocrine: Negative for cold intolerance and heat intolerance.   Genitourinary: Negative for flank pain.   Musculoskeletal: Negative for back pain.   Skin: Negative for rash.   Allergic/Immunologic: Negative for immunocompromised state.   Neurological: Negative for dizziness.   Psychiatric/Behavioral: Negative for agitation.      Physical Exam:    Blood pressure (!) 176/102, pulse 90, resp. rate 18, height 6' 4\" (1.93 m), weight 122 kg (270 lb).    General/Constitutional: NAD, well developed, well nourished  HENT: Normocephalic, atraumatic  CV: " Intact distal pulses, regular rate  Resp: No respiratory distress or labored breathing  Lymphatic: No lymphadenopathy palpated  Neuro: Alert and Oriented x 3, no focal deficits  Psych: Normal mood, normal affect, normal judgement, normal behavior  Skin: Warm, dry, no rashes, no erythema      Knee Exam (focused):  Visual inspection of the Right knee demonstrates normal contour without atrophy.   Healed previous incisions   There is no significant erythema or edema.    Tracejoint effusion   Range of motion is full from 0-130 degrees of flexion   Able to straight leg raise   No bursal tender to palpation  Minimal medial joint line tenderness, + lateral joint line tenderness  - medial Aramis's, + lateral Aramis's  1A Lachman exam, Stable posterior drawer  - dial test  Stable to varus and valgus stress at both 0 and 30°  Patella tracks normally.  No J sign.  No apprehension.  Translation is approximately 2 quadrants and is equal to the contralateral side  Patellar eversion is similar to the contralateral side    Examination of the patient's ipsilateral hip demonstrates full painless range of motion.  No crepitus.      LE NV Exam: +2 DP/PT pulses bilaterally  Sensation intact to light touch L2-S1 bilaterally     Bilateral hip ROM demonstrates no pain actively or passively    No calf tenderness to palpation bilaterally    Knee Imaging    X-rays of the right knee were reviewed, which demonstrate no acute osseous abnormalities with mild medial compartment osteoarthritis.  I have reviewed the radiology report and agree with their impression.    MRI of the right knee were reviewed, which demonstrate undersurface tearing posterior root lateral meniscus without flipped fragment.  Medial meniscus appears intact.  There is grade 3 cartilage changes along the mid trochlea as well as grade 2/grade 3 cartilage fissuring along the weightbearing aspect of the medial femoral condyle.  Lastly, there is evidence of complex collection  around the prepatellar bursa suspicious of chronic hemorrhagic bursitis.  I have reviewed the radiology report and agree with their impression.      Scribe Attestation      I,:  Tre Aleman am acting as a scribe while in the presence of the attending physician.:       I,:  Armond Bourne, DO personally performed the services described in this documentation    as scribed in my presence.:

## 2024-04-11 NOTE — PRE-PROCEDURE INSTRUCTIONS
No outpatient medications have been marked as taking for the 4/22/24 encounter (Hospital Encounter).    Medication instructions for day surgery reviewed. Please use only a sip of water to take your instructed medications. Avoid all over the counter vitamins, supplements and NSAIDS for one week prior to surgery per anesthesia guidelines. Tylenol is ok to take as needed.     You will receive a call one business day prior to surgery with an arrival time and hospital directions. If your surgery is scheduled on a Monday, the hospital will be calling you on the Friday prior to your surgery. If you have not heard from anyone by 8pm, please call the hospital supervisor through the hospital  at [phone]. ([place] [phone] or [place] [phone]).    Do not eat or drink anything after midnight the night before your surgery, including candy, mints, lifesavers, or chewing gum. Do not drink alcohol 24hrs before your surgery. Try not to smoke at least 24hrs before your surgery.       Follow the pre surgery showering instructions as listed in the “My Surgical Experience Booklet” or otherwise provided by your surgeon's office. Do not use a blade to shave the surgical area 1 week before surgery. It is okay to use a clean electric clippers up to 24 hours before surgery. Do not apply any lotions, creams, including makeup, cologne, deodorant, or perfumes after showering on the day of your surgery. Do not use dry shampoo, hair spray, hair gel, or any type of hair products.     No contact lenses, eye make-up, or artificial eyelashes. Remove nail polish, including gel polish, and any artificial, gel, or acrylic nails if possible. Remove all jewelry including rings and body piercing jewelry.     Wear causal clothing that is easy to take on and off. Consider your type of surgery.    Keep any valuables, jewelry, piercings at home. Please bring any specially ordered equipment (sling, braces) if indicated.    Arrange for a  responsible person to drive you to and from the hospital on the day of your surgery. Please confirm the visitor policy for the day of your procedure when you receive your phone call with an arrival time.     Call the surgeon's office with any new illnesses, exposures, or additional questions prior to surgery.    Please reference your “My Surgical Experience Booklet” for additional information to prepare for your upcoming surgery.

## 2024-04-22 ENCOUNTER — HOSPITAL ENCOUNTER (OUTPATIENT)
Facility: AMBULARY SURGERY CENTER | Age: 44
Setting detail: OUTPATIENT SURGERY
Discharge: HOME | End: 2024-04-22
Attending: STUDENT IN AN ORGANIZED HEALTH CARE EDUCATION/TRAINING PROGRAM | Admitting: STUDENT IN AN ORGANIZED HEALTH CARE EDUCATION/TRAINING PROGRAM
Payer: OTHER MISCELLANEOUS

## 2024-04-22 ENCOUNTER — ANESTHESIA (OUTPATIENT)
Dept: PERIOP | Facility: AMBULARY SURGERY CENTER | Age: 44
End: 2024-04-22
Payer: OTHER MISCELLANEOUS

## 2024-04-22 VITALS
SYSTOLIC BLOOD PRESSURE: 139 MMHG | DIASTOLIC BLOOD PRESSURE: 65 MMHG | HEART RATE: 74 BPM | WEIGHT: 270 LBS | TEMPERATURE: 98 F | RESPIRATION RATE: 16 BRPM | BODY MASS INDEX: 32.88 KG/M2 | HEIGHT: 76 IN | OXYGEN SATURATION: 93 %

## 2024-04-22 DIAGNOSIS — Z98.890 S/P RIGHT KNEE ARTHROSCOPY: Primary | ICD-10-CM

## 2024-04-22 PROCEDURE — NC001 PR NO CHARGE: Performed by: STUDENT IN AN ORGANIZED HEALTH CARE EDUCATION/TRAINING PROGRAM

## 2024-04-22 PROCEDURE — 29876 ARTHRS KNEE SURG SYNVCT MAJ: CPT | Performed by: STUDENT IN AN ORGANIZED HEALTH CARE EDUCATION/TRAINING PROGRAM

## 2024-04-22 PROCEDURE — 29881 ARTHRS KNE SRG MNISECTMY M/L: CPT | Performed by: STUDENT IN AN ORGANIZED HEALTH CARE EDUCATION/TRAINING PROGRAM

## 2024-04-22 RX ORDER — SODIUM CHLORIDE, SODIUM LACTATE, POTASSIUM CHLORIDE, CALCIUM CHLORIDE 600; 310; 30; 20 MG/100ML; MG/100ML; MG/100ML; MG/100ML
INJECTION, SOLUTION INTRAVENOUS CONTINUOUS PRN
Status: DISCONTINUED | OUTPATIENT
Start: 2024-04-22 | End: 2024-04-22

## 2024-04-22 RX ORDER — SODIUM CHLORIDE, SODIUM LACTATE, POTASSIUM CHLORIDE, CALCIUM CHLORIDE 600; 310; 30; 20 MG/100ML; MG/100ML; MG/100ML; MG/100ML
125 INJECTION, SOLUTION INTRAVENOUS CONTINUOUS
Status: DISCONTINUED | OUTPATIENT
Start: 2024-04-22 | End: 2024-04-22 | Stop reason: HOSPADM

## 2024-04-22 RX ORDER — ONDANSETRON 2 MG/ML
4 INJECTION INTRAMUSCULAR; INTRAVENOUS EVERY 6 HOURS PRN
Status: DISCONTINUED | OUTPATIENT
Start: 2024-04-22 | End: 2024-04-22 | Stop reason: HOSPADM

## 2024-04-22 RX ORDER — ACETAMINOPHEN 10 MG/ML
1000 INJECTION, SOLUTION INTRAVENOUS ONCE
Status: COMPLETED | OUTPATIENT
Start: 2024-04-22 | End: 2024-04-22

## 2024-04-22 RX ORDER — LIDOCAINE HYDROCHLORIDE 10 MG/ML
INJECTION, SOLUTION EPIDURAL; INFILTRATION; INTRACAUDAL; PERINEURAL AS NEEDED
Status: DISCONTINUED | OUTPATIENT
Start: 2024-04-22 | End: 2024-04-22

## 2024-04-22 RX ORDER — FENTANYL CITRATE/PF 50 MCG/ML
50 SYRINGE (ML) INJECTION
Status: COMPLETED | OUTPATIENT
Start: 2024-04-22 | End: 2024-04-22

## 2024-04-22 RX ORDER — HYDROMORPHONE HCL/PF 1 MG/ML
0.5 SYRINGE (ML) INJECTION
Status: COMPLETED | OUTPATIENT
Start: 2024-04-22 | End: 2024-04-22

## 2024-04-22 RX ORDER — MORPHINE SULFATE 4 MG/ML
INJECTION, SOLUTION INTRAMUSCULAR; INTRAVENOUS AS NEEDED
Status: DISCONTINUED | OUTPATIENT
Start: 2024-04-22 | End: 2024-04-22 | Stop reason: HOSPADM

## 2024-04-22 RX ORDER — SODIUM CHLORIDE, SODIUM LACTATE, POTASSIUM CHLORIDE, CALCIUM CHLORIDE 600; 310; 30; 20 MG/100ML; MG/100ML; MG/100ML; MG/100ML
50 INJECTION, SOLUTION INTRAVENOUS CONTINUOUS
Status: DISCONTINUED | OUTPATIENT
Start: 2024-04-22 | End: 2024-04-22 | Stop reason: HOSPADM

## 2024-04-22 RX ORDER — ONDANSETRON 2 MG/ML
INJECTION INTRAMUSCULAR; INTRAVENOUS AS NEEDED
Status: DISCONTINUED | OUTPATIENT
Start: 2024-04-22 | End: 2024-04-22

## 2024-04-22 RX ORDER — ACETAMINOPHEN 10 MG/ML
1000 INJECTION, SOLUTION INTRAVENOUS ONCE
Qty: 100 ML | Refills: 0 | Status: DISCONTINUED | OUTPATIENT
Start: 2024-04-22 | End: 2024-04-22

## 2024-04-22 RX ORDER — ACETAMINOPHEN 325 MG/1
975 TABLET ORAL EVERY 8 HOURS
Status: DISCONTINUED | OUTPATIENT
Start: 2024-04-22 | End: 2024-04-22 | Stop reason: HOSPADM

## 2024-04-22 RX ORDER — METOCLOPRAMIDE HYDROCHLORIDE 5 MG/ML
10 INJECTION INTRAMUSCULAR; INTRAVENOUS ONCE AS NEEDED
Status: DISCONTINUED | OUTPATIENT
Start: 2024-04-22 | End: 2024-04-22 | Stop reason: HOSPADM

## 2024-04-22 RX ORDER — OXYCODONE HYDROCHLORIDE 5 MG/1
5 TABLET ORAL EVERY 4 HOURS PRN
Qty: 15 TABLET | Refills: 0 | Status: SHIPPED | OUTPATIENT
Start: 2024-04-22

## 2024-04-22 RX ORDER — MIDAZOLAM HYDROCHLORIDE 2 MG/2ML
INJECTION, SOLUTION INTRAMUSCULAR; INTRAVENOUS AS NEEDED
Status: DISCONTINUED | OUTPATIENT
Start: 2024-04-22 | End: 2024-04-22

## 2024-04-22 RX ORDER — BUPIVACAINE HYDROCHLORIDE 2.5 MG/ML
INJECTION, SOLUTION EPIDURAL; INFILTRATION; INTRACAUDAL AS NEEDED
Status: DISCONTINUED | OUTPATIENT
Start: 2024-04-22 | End: 2024-04-22 | Stop reason: HOSPADM

## 2024-04-22 RX ORDER — OXYCODONE HYDROCHLORIDE 5 MG/1
10 TABLET ORAL EVERY 4 HOURS PRN
Status: DISCONTINUED | OUTPATIENT
Start: 2024-04-22 | End: 2024-04-22 | Stop reason: HOSPADM

## 2024-04-22 RX ORDER — KETOROLAC TROMETHAMINE 30 MG/ML
INJECTION, SOLUTION INTRAMUSCULAR; INTRAVENOUS AS NEEDED
Status: DISCONTINUED | OUTPATIENT
Start: 2024-04-22 | End: 2024-04-22

## 2024-04-22 RX ORDER — OXYCODONE HYDROCHLORIDE 5 MG/1
5 TABLET ORAL EVERY 4 HOURS PRN
Status: DISCONTINUED | OUTPATIENT
Start: 2024-04-22 | End: 2024-04-22 | Stop reason: HOSPADM

## 2024-04-22 RX ORDER — PROPOFOL 10 MG/ML
INJECTION, EMULSION INTRAVENOUS AS NEEDED
Status: DISCONTINUED | OUTPATIENT
Start: 2024-04-22 | End: 2024-04-22

## 2024-04-22 RX ORDER — CEFAZOLIN SODIUM 1 G/3ML
INJECTION, POWDER, FOR SOLUTION INTRAMUSCULAR; INTRAVENOUS AS NEEDED
Status: DISCONTINUED | OUTPATIENT
Start: 2024-04-22 | End: 2024-04-22

## 2024-04-22 RX ORDER — ACETAMINOPHEN 325 MG/1
975 TABLET ORAL ONCE
Status: COMPLETED | OUTPATIENT
Start: 2024-04-22 | End: 2024-04-22

## 2024-04-22 RX ORDER — FENTANYL CITRATE 50 UG/ML
INJECTION, SOLUTION INTRAMUSCULAR; INTRAVENOUS AS NEEDED
Status: DISCONTINUED | OUTPATIENT
Start: 2024-04-22 | End: 2024-04-22

## 2024-04-22 RX ORDER — DEXAMETHASONE SODIUM PHOSPHATE 10 MG/ML
INJECTION, SOLUTION INTRAMUSCULAR; INTRAVENOUS AS NEEDED
Status: DISCONTINUED | OUTPATIENT
Start: 2024-04-22 | End: 2024-04-22

## 2024-04-22 RX ORDER — CEFAZOLIN SODIUM 2 G/50ML
2000 SOLUTION INTRAVENOUS ONCE
Status: DISCONTINUED | OUTPATIENT
Start: 2024-04-22 | End: 2024-04-22 | Stop reason: SDUPTHER

## 2024-04-22 RX ORDER — ONDANSETRON 2 MG/ML
4 INJECTION INTRAMUSCULAR; INTRAVENOUS ONCE AS NEEDED
Status: DISCONTINUED | OUTPATIENT
Start: 2024-04-22 | End: 2024-04-22 | Stop reason: HOSPADM

## 2024-04-22 RX ORDER — CEFAZOLIN SODIUM 2 G/50ML
2000 SOLUTION INTRAVENOUS ONCE
Status: DISCONTINUED | OUTPATIENT
Start: 2024-04-22 | End: 2024-04-22 | Stop reason: HOSPADM

## 2024-04-22 RX ORDER — CHLORHEXIDINE GLUCONATE ORAL RINSE 1.2 MG/ML
15 SOLUTION DENTAL ONCE
Status: DISCONTINUED | OUTPATIENT
Start: 2024-04-22 | End: 2024-04-22 | Stop reason: HOSPADM

## 2024-04-22 RX ORDER — CEFAZOLIN SODIUM 1 G/50ML
1000 SOLUTION INTRAVENOUS ONCE
Status: DISCONTINUED | OUTPATIENT
Start: 2024-04-22 | End: 2024-04-22 | Stop reason: HOSPADM

## 2024-04-22 RX ADMIN — DEXAMETHASONE SODIUM PHOSPHATE 10 MG: 10 INJECTION INTRAMUSCULAR; INTRAVENOUS at 08:52

## 2024-04-22 RX ADMIN — HYDROMORPHONE HYDROCHLORIDE 0.5 MG: 1 INJECTION, SOLUTION INTRAMUSCULAR; INTRAVENOUS; SUBCUTANEOUS at 10:40

## 2024-04-22 RX ADMIN — FENTANYL CITRATE 25 MCG: 50 INJECTION INTRAMUSCULAR; INTRAVENOUS at 09:09

## 2024-04-22 RX ADMIN — FENTANYL CITRATE 50 MCG: 50 INJECTION INTRAMUSCULAR; INTRAVENOUS at 08:52

## 2024-04-22 RX ADMIN — PROPOFOL 200 MG: 10 INJECTION, EMULSION INTRAVENOUS at 08:47

## 2024-04-22 RX ADMIN — ACETAMINOPHEN 975 MG: 325 TABLET, FILM COATED ORAL at 08:03

## 2024-04-22 RX ADMIN — SODIUM CHLORIDE, SODIUM LACTATE, POTASSIUM CHLORIDE, AND CALCIUM CHLORIDE: .6; .31; .03; .02 INJECTION, SOLUTION INTRAVENOUS at 08:44

## 2024-04-22 RX ADMIN — FENTANYL CITRATE 25 MCG: 50 INJECTION INTRAMUSCULAR; INTRAVENOUS at 09:06

## 2024-04-22 RX ADMIN — MIDAZOLAM 2 MG: 1 INJECTION INTRAMUSCULAR; INTRAVENOUS at 08:44

## 2024-04-22 RX ADMIN — OXYCODONE HYDROCHLORIDE 10 MG: 5 TABLET ORAL at 11:45

## 2024-04-22 RX ADMIN — PROPOFOL 50 MG: 10 INJECTION, EMULSION INTRAVENOUS at 09:25

## 2024-04-22 RX ADMIN — CEFAZOLIN SODIUM 2000 MG: 2 SOLUTION INTRAVENOUS at 08:47

## 2024-04-22 RX ADMIN — ONDANSETRON 4 MG: 2 INJECTION INTRAMUSCULAR; INTRAVENOUS at 08:52

## 2024-04-22 RX ADMIN — LIDOCAINE HYDROCHLORIDE 50 MG: 10 INJECTION, SOLUTION EPIDURAL; INFILTRATION; INTRACAUDAL at 08:47

## 2024-04-22 RX ADMIN — FENTANYL CITRATE 50 MCG: 50 INJECTION INTRAMUSCULAR; INTRAVENOUS at 10:29

## 2024-04-22 RX ADMIN — KETOROLAC TROMETHAMINE 15 MG: 30 INJECTION, SOLUTION INTRAMUSCULAR; INTRAVENOUS at 09:25

## 2024-04-22 RX ADMIN — CEFAZOLIN 1000 MG: 1 INJECTION, POWDER, FOR SOLUTION INTRAMUSCULAR; INTRAVENOUS at 08:51

## 2024-04-22 RX ADMIN — ACETAMINOPHEN 1000 MG: 10 INJECTION INTRAVENOUS at 11:15

## 2024-04-22 RX ADMIN — PROPOFOL 200 MG: 10 INJECTION, EMULSION INTRAVENOUS at 08:49

## 2024-04-22 RX ADMIN — HYDROMORPHONE HYDROCHLORIDE 0.5 MG: 1 INJECTION, SOLUTION INTRAMUSCULAR; INTRAVENOUS; SUBCUTANEOUS at 10:34

## 2024-04-22 RX ADMIN — FENTANYL CITRATE 50 MCG: 50 INJECTION INTRAMUSCULAR; INTRAVENOUS at 10:22

## 2024-04-22 NOTE — OP NOTE
OPERATIVE REPORT  PATIENT NAME: Amandeep Emanuel    :  1980  MRN: [id number]  Pt Location: [facility] OR [facility] 06    SURGERY DATE: 2024    Surgeons and Role:     * Armond Bourne DO - Primary     * Yvonne Mart MD - Assisting     *Carina SARGENT      Preop Diagnosis:  Tear of lateral meniscus of right knee, current, unspecified tear type, initial encounter [S83.281A]    Post-Op Diagnosis Codes:     * Tear of lateral meniscus of right knee, current, unspecified tear type, initial encounter [S83.281A]    Procedure(s):  Right - KNEE ARTHROSCOPIC PARTIAL LATERAL MENISCECTOMY  Right - KNEE ARTHROSCOPIC CHONDROPLASTY MEDIAL FEMORAL CONDYLE  Right - KNEE ARTHROSCOPIC CHONDROPLASTY PATELLA  Right - KNEE ARTHROSCOPIC SYNOVECTOMY, MAJOR    Specimen(s):  * No specimens in log *    Estimated Blood Loss:   Minimal    Drains:  * No LDAs found *    Anesthesia Type:   General    Operative Indications:  Tear of lateral meniscus of right knee, current, unspecified tear type, initial encounter [S83.281A]  The patient is a very active 44 year-old  who comes in to me with significant problems associated with right knee. After failure of conservative nonoperative care, eventually came and saw me, on physical examination with x-ray and radiographic findings, found to have a lateral meniscus tear, synovitis.  The risks and benefits of surgical intervention were explained including, but not exclusive to, infection, DVT, loss of range of motion, stiffness, continuance of pain, failure, fracture, dislocation, delayed union, malunion, nonunion, wound complications, and neurovascular compromise.]      Operative Findings:  OCD patella - median ridge  OCD MFC   Lateral meniscus root tear - intact root  Synovitis, major    Complications:   None    Procedure and Technique:  The patient was seen and examined in the pre-operative holding area. The patient's identity was confirmed against the hospital wristband and chart. The  operative extremity was marked and signed against the consent, imaging, and patient confirmation. The patient was brought in the operating room, placed supine on the operating table.  Once on the operating table, underwent general endotracheal anesthesia.  Once adequately anesthetized, found to have a stable ligamentous knee exam. All bony prominences were padded. The leg was prepped and draped in sterile fashion. A formal timeout was performed, matching the operative extremity to the consent, imaging, and site marking.     An inferior lateral patellar portal was created and examination of the  intraarticular portion of the joint revealed marked grade 3 changes of the median ridge of the patella, grade 2 changes of the trochlear groove.    The patient was noted to have significant synovitis in the suprapatellar pouch area with multiple small articular fragments.     A medial incision was made using a spinal needle.    In the medial compartment of the knee, the patient was noted to have intact medial meniscus. The medial femoral condyle was noted to have grade 3 changes globally, the medial tibial plateau was found to have grade 2 changes. A medial compartment synovectomy was performed.     In the intracondylar notch of the ACL was found to be intact.  The PCL was found to be intact.  A synovectomy was performed.    No loose bodies were located in the intracondylar notch.     In the lateral compartment of the knee, the patient was noted to have a complex tear of the posterior horn of the lateral meniscus. With the use of straight and up biting instrumentation, as well as an oscillating shaver, the meniscus was debrided back to a stable rim. The actual meniscal root was probed and was stable without disruption. No lateral root repair was requited. The lateral femoral condyle was noted to have grade 2 changes.  The lateral tibial plateau was found to have grade 2 changes. A lateral compartment synovectomy was  performed.    The patient was noted to have significant synovitis in the medial and lateral gutters.  With the use of  an oscillating shaver we did an extensive synovectomy of both the medial and lateral gutters removing all small intra-articular fragments..     We then turned our attention to the patella. With an oscillating shaver,we stabilized the osteochondral injury to the patella. With the use of radiofrequncy, we stabilized the OCD lesion in a chondroplasty fashion. We then performed an extensive suprapatella synovectomy.     The knee was then copiously irrigated and drained.  The wounds were closed with 3-0 nylon interrupted sutures.      A sterile dressing was placed on the knee.  The knee was kept out in extension and was found to have good capillary refill and pulses.  The patient's neurovascular was intact.  The patient was awoken in the operating room, transferred to a stretcher in the operating room and brought to recovery room in stable condition.     I was present for the entire procedure.    Patient Disposition:  PACU         SIGNATURE: Armond Bourne DO  DATE: April 22, 2024  TIME: 9:34 AM

## 2024-04-22 NOTE — DISCHARGE INSTR - AVS FIRST PAGE
DR. JANSEN KNEE ARTHROSCOPY     POST OPERATIVE INSTRUCTIONS          EXPECTATIONS     It is normal to have some discomfort in your knee for several days after surgery. For the next 48 to 72 hours use ice bags or gel packs around the knee to help reduce the pain and swelling. Place a pillow underneath your heel or calf to help reduce pressure and discomfort.?Do not place any pillows under your knee as this can cause stiffness.      WEIGHT BEARING AND WOUND CARE     Weight bear as tolerated on your operative leg. You may need crutches for 1-2 days for standing and walking.?Do not drive until instructed by your physician.     The bandage over your knee may be removed 3 days?after surgery and then you may shower. Please leave any steri-strips in place.?After showering, cover your incisions with band aids.? Replace the band-aids every time you shower. No bathing, swimming, or submerging until discussed at your follow-up appointment.?     MEDICATIONS     You have been prescribed several medications. Take as directed on the instructions. If you experience any adverse effects, stop taking them immediately and call the office for additional instructions.      Tylenol: 1000 mg every 8 hours for mild/moderate pain     Ibuprofen: 600mg every 8 hours for mild/moderate pain. You can take this together with the Tylenol, they do not interact.       Narcotic pain medication: You may have been prescribed a strong narcotic medication. Take this according to the pharmacy instructions.      FOLLOW UP     The findings of your surgery will be explained to you and your family immediately after surgery. However, in the post-operative period, during recovery from anesthesia you may not fully remember or fully understand what was said. This will be explained once again when you return for your post-op appointment.      You should call to schedule a post-operative appointment in the office 10-14 days from the date of surgery.      If you  experience fever over 101 degrees, excessive bleeding, persistent nausea or vomiting, decreased sensation or discoloration of the operative arm, or severe uncontrollable pain please contact Dr. Bourne’s office immediately.

## 2024-04-22 NOTE — ANESTHESIA POSTPROCEDURE EVALUATION
Post-Op Assessment Note    CV Status:  Stable  Pain Score: 0    Pain management: adequate       Mental Status:  Sleepy and arousable   Hydration Status:  Euvolemic   PONV Controlled:  Controlled   Airway Patency:  Patent     Post Op Vitals Reviewed: Yes    No anethesia notable event occurred.    Staff: CRNA   Comments: HOB elevated, oral airway in place- PACU RN aware.              BP   119/53    Temp   96.9   Pulse   68   Resp   10   SpO2   94% 4L simple face mask

## 2024-04-22 NOTE — ANESTHESIA PREPROCEDURE EVALUATION
Procedure:  KNEE ARTHROSCOPIC REPAIR MENISCUS - ROOT REPAIR (Right: Knee)  The patient is a 44 y.o. 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  Work-related injury at the beginning of March when he states he was working on a roof carrying heavy equipment when he twisted awkwardly and felt sharp pain and a pop in the knee.  He states during the injury he felt a pop along the lateral aspect of his knee followed by significant pain.  He does still report lateral knee pain as well as posterior knee pain with swelling.  He currently describes his pain as dull and achy and rates it a 4-10.  He states that deep squatting and twisting increases his symptoms.  He does occasionally get a clicking and catching sensation felt in his knee.  He does have a prior history of a right knee partial bursectomy 15+ years ago after repeated aspirations.  He is currently manages pain with ice and over-the-counter medication.  He denies any numbness or tingling.   Relevant Problems   CARDIO   (+) AVNRT (AV eric re-entry tachycardia)   (+) Hypertension   (+) Paroxysmal SVT (supraventricular tachycardia)      NEURO/PSYCH   (+) Severe recurrent major depression without psychotic features (HCC)        Physical Exam    Airway    Mallampati score: III  TM Distance: >3 FB  Neck ROM: full     Dental       Cardiovascular  Cardiovascular exam normal    Pulmonary  Pulmonary exam normal     Other Findings      Surgical History   Current as of 04/22/24 0704  KNEE SURGERY BACK SURGERY   ABLATION OF DYSRHYTHMIC FOCUS SPINAL FUSION   KNEE ARTHROSCOPY ANKLE LIGAMENT RECONSTRUCTION   ELBOW SURGERY ROTATOR CUFF REPAIR   SHOULDER SURGERY FOOT SURGERY     Substance History   Current as of 04/22/24 0704  Smoking Status: Never   Passive Exposure: Past   Smokeless Tobacco Status: Former   Alcohol use: No   Drug use: Never     Problem List   Current as of 04/22/24  0704  AVNRT (AV eric re-entry tachycardia)   Abnormal stress echo   Bronchitis   Herniated lumbar intervertebral disc   Hypertension   Lumbar radiculopathy   Pain, joint, ankle and foot, left   Paroxysmal SVT (supraventricular tachycardia)   Pleurisy   Sacroiliac joint somatic dysfunction   Severe recurrent major depression without psychotic features (HCC)   Solitary pulmonary nodule   Spinal stenosis of lumbar region with radiculopathy   Traumatic rupture of anterior tibial tendon, initial encounter   Vasovagal syncope   POSTOPERATIVE DIAGNOSIS  1) ATYPICAL AVNRT (Fast-Slow)  2) Successful ablation of  Atypical AVNRT with Slow Pathway modification.      Procedures Performed  1.   Ablation of supraventricular tachycardia.   2.   3-D mapping with NAVX  3.   Drug infusion with isoproterenol.   4.   EP testing with left atrial pacing and recording.        Anesthesia Plan  ASA Score- 2     Anesthesia Type- general with ASA Monitors.         Additional Monitors:     Airway Plan: LMA.           Plan Factors-Exercise tolerance (METS): >4 METS.    Chart reviewed. EKG reviewed. Imaging results reviewed. Existing labs reviewed. Patient summary reviewed.    Patient is not a current smoker.              Induction- intravenous.    Postoperative Plan-     Informed Consent- Anesthetic plan and risks discussed with patient.  I personally reviewed this patient with the CRNA. Discussed and agreed on the Anesthesia Plan with the CRNA..

## 2024-04-22 NOTE — INTERVAL H&P NOTE
H&P reviewed. After examining the patient I find no changes in the patients condition since the H&P had been written.    Vitals:    04/22/24 0757   BP: 169/92   Pulse: 86   Resp: 18   Temp: (!) 97.3 °F (36.3 °C)   SpO2: 96%

## 2024-04-23 ENCOUNTER — TELEPHONE (OUTPATIENT)
Age: 44
End: 2024-04-23

## 2024-04-23 DIAGNOSIS — S83.281A TEAR OF LATERAL MENISCUS OF RIGHT KNEE, CURRENT, UNSPECIFIED TEAR TYPE, INITIAL ENCOUNTER: Primary | ICD-10-CM

## 2024-04-23 RX ORDER — HYDROCODONE BITARTRATE AND ACETAMINOPHEN 5; 325 MG/1; MG/1
1 TABLET ORAL EVERY 6 HOURS PRN
Qty: 20 TABLET | Refills: 0 | Status: SHIPPED | OUTPATIENT
Start: 2024-04-23 | End: 2024-05-01

## 2024-04-23 NOTE — TELEPHONE ENCOUNTER
Patient called the RX Refill Line. Message is being forwarded to the office.     Patient is requesting an update on his message from this AM.  He is requesting something different.  Oxy is not working for him and he is in a lot of pain.  Please reach out to him ASAP.

## 2024-04-23 NOTE — TELEPHONE ENCOUNTER
Reason for call:   [x] Refill   [] Prior Auth  [] Other:       Patient state he had meniscus surgery yesterday 04.22.2024 and was given oxycodone 5 mg immediate release, but it is not working for the pain, he said he took 2 tablets after surgery and then two before bedtime and the oxycodone did not do anything for he pain    He is requesting for the oxycodone to be switched to percocet which he has used in the past.     Patient would like a call back as well when or if this is going to be done. He state he know we are busy but would like a call back soon if possible [phone]

## 2024-04-23 NOTE — TELEPHONE ENCOUNTER
Called and spoke w/pt and relayed Dr Bourne's msg to pt in detail. No further questions/concerns.

## 2024-05-01 ENCOUNTER — HOSPITAL ENCOUNTER (OUTPATIENT)
Dept: VASCULAR ULTRASOUND | Facility: HOSPITAL | Age: 44
Discharge: HOME | End: 2024-05-01
Attending: STUDENT IN AN ORGANIZED HEALTH CARE EDUCATION/TRAINING PROGRAM
Payer: OTHER MISCELLANEOUS

## 2024-05-01 ENCOUNTER — OFFICE VISIT (OUTPATIENT)
Dept: OBGYN CLINIC | Facility: CLINIC | Age: 44
End: 2024-05-01

## 2024-05-01 VITALS
WEIGHT: 270 LBS | RESPIRATION RATE: 17 BRPM | BODY MASS INDEX: 32.88 KG/M2 | HEART RATE: 77 BPM | SYSTOLIC BLOOD PRESSURE: 149 MMHG | DIASTOLIC BLOOD PRESSURE: 94 MMHG | HEIGHT: 76 IN

## 2024-05-01 DIAGNOSIS — S83.281A TEAR OF LATERAL MENISCUS OF RIGHT KNEE, CURRENT, UNSPECIFIED TEAR TYPE, INITIAL ENCOUNTER: ICD-10-CM

## 2024-05-01 DIAGNOSIS — S83.281A TEAR OF LATERAL MENISCUS OF RIGHT KNEE, CURRENT, UNSPECIFIED TEAR TYPE, INITIAL ENCOUNTER: Primary | ICD-10-CM

## 2024-05-01 PROCEDURE — 99024 POSTOP FOLLOW-UP VISIT: CPT | Performed by: STUDENT IN AN ORGANIZED HEALTH CARE EDUCATION/TRAINING PROGRAM

## 2024-05-01 PROCEDURE — 93970 EXTREMITY STUDY: CPT | Performed by: SURGERY

## 2024-05-01 PROCEDURE — 93970 EXTREMITY STUDY: CPT

## 2024-05-01 RX ORDER — HYDROCODONE BITARTRATE AND ACETAMINOPHEN 5; 325 MG/1; MG/1
1 TABLET ORAL EVERY 6 HOURS PRN
Qty: 20 TABLET | Refills: 0 | Status: SHIPPED | OUTPATIENT
Start: 2024-05-01

## 2024-05-01 NOTE — PROGRESS NOTES
Knee Post Operative Visit     Assesment:     44 y.o. male 9 days s/p surgical arthroscopy of the right knee with partial lateral meniscectomy and medial chondroplasty, DOS: 4/22/2024    Plan:  The patient's diagnosis and treatment were discussed at length today. We discussed no treatment, non-operative treatment, and operative treatment.    Amandeep presents today 9 days status post right knee arthroscopy with partial lateral meniscectomy and medial chondroplasty performed on 4/22/2024.  He has not yet started outpatient physical therapy, but I did provide him with a new referral and copy of his PT protocol in his after visit summary.  I did also provide him with a refill of his pain medication for lateral thigh pain and ecchymosis likely due to tourniquet use and post operative Eliquis.  Given his ongoing calf tightness and pain I did order a stat duplex study to rule out DVT.  He can continue use of his Eliquis.  I did also provide him with a work note remaining him out of work for an additional 4 weeks time.  He can continue ice and over-the-counter medication as needed for pain relief.  He is to follow-up in 4 weeks for reevaluation.    Addendum, 3:52 PM 5/1/2024.  Amandeep DVT study was negative for any blood clot in his leg.  I did advise him to decrease his Eliquis dose to 2.5 mg twice daily.  He should begin physical therapy with modalities aimed at decreasing inflammation and bruising/ecchymosis in the leg and thigh.  He is to follow-up in 4 weeks and continue DVT prophylaxis until 5/22/2024.    Post-Operative treatment:    Ice to knee for 20 minutes at least 1-2 times daily.  OTC NSAIDS prn for pain.  Begin outpatient physical therapy.  Work note provided at today's visit.    Imaging:    All imaging from today was reviewed by myself and explained to the patient.   Intraoperative images from right knee arthroscopy with partial lateral meniscectomy and medial chondroplasty performed on 4/22/2024 were reviewed at  "today's visit.  Stat duplex study of right lower extremity was ordered to rule out DVT.    Weight bearing:  as tolerated     ROM:  Full    Brace:  No brace needed    DVT Prophylaxis: Continue use of Eliquis    Follow up:   4 weeks    Patient was advised that if they have any fevers, chills, chest pain, shortness of breath, redness or drainage from the incision, please let our office know immediately.          Chief Complaint   Patient presents with    Right Knee - Post-op     Sx: 04/22/24       History of Present Illness:    The patient is a 44 y.o. male who is being evaluated post operatively 9 days  status post right knee arthroscopy with partial lateral meniscectomy and medial chondroplasty performed on 4/23/2024.  He states over the last several days he has been exhibiting lateral thigh pain and soreness and several days ago he did begin exhibiting calf soreness and tightness.  He states that his pain has dull and achy and rates it a 6 out of 10.  He has not yet started outpatient physical therapy.  He denies any new injury or trauma to his room.  He denies any numbness or tingling.      I have reviewed the past medical, surgical, social and family history, medications and allergies as documented in the EMR.    Review of systems: ROS is negative other than that noted in the HPI.  Constitutional: Negative for fatigue and fever.        Physical Exam:    Blood pressure 149/94, pulse 77, resp. rate 17, height 6' 4\" (1.93 m), weight 122 kg (270 lb).    General/Constitutional: NAD, well developed, well nourished  HENT: Normocephalic, atraumatic  CV: Intact distal pulses, regular rate  Resp: No respiratory distress or labored breathing  Lymphatic: No lymphadenopathy palpated  Neuro: Alert and Oriented x 3, no focal deficits  Psych: Normal mood, normal affect, normal judgement, normal behavior  Skin: Warm, dry, no rashes, no erythema      Knee Exam (focused):    Visual inspection of the right knee demonstrates normal " contour without atrophy.  Incisions appear clean and dry with no drainage or erythema.  Mild joint effusion.  Motor and sensory function intact.  Compartments soft and compressible capillary refill is brisk.  Negative Homans' sign.  Mild jennifer-incisional tenderness to palpation.  New Steri-Strips applied.  Aramis's not tested.  Cruciates and collaterals not tested.  Able to straight leg raise without lag.    Examination of the patient's ipsilateral hip demonstrates full painless range of motion.  No crepitus.     LE NV Exam: +2 DP/PT pulses bilaterally  Sensation intact to light touch L2-S1 bilaterally     Bilateral hip ROM demonstrates no pain actively or passively    No calf tenderness to palpation bilaterally    Scribe Attestation      I,:  Tre Aleman am acting as a scribe while in the presence of the attending physician.:       I,:  Armond Bourne, DO personally performed the services described in this documentation    as scribed in my presence.:

## 2024-05-01 NOTE — LETTER
May 1, 2024     Patient: Amandeep Emanuel  YOB: 1980  Date of Visit: 5/1/2024      To Whom it May Concern:    Amandeep Emanuel is under my professional care. Amandeep was seen in my office on 5/1/2024. Amandeep should remain out of work for an additional 4 weeks time due to recovery from surgery. His return to work will be re-evaluated at that time.    If you have any questions or concerns, please don't hesitate to call.         Sincerely,          Armond Bourne, DO        CC: No Recipients

## 2024-05-01 NOTE — PATIENT INSTRUCTIONS
Knee Arthroscopy   Rehabilitation Guidelines    GENERAL GUIDELINES  No bathing/swimming until after suture removal  Showering permitted with waterproof covering over sutures  Driving:  Off all pain medications when operating a vehicle  Crutches for ambulation for 1 week determined by MD/PT discontinue when gait is normalized (i.e. no limp present)  Return to work as treatment by MD/PT dependent on work demands  Use ice and elevation for swelling/pain control    PHASE 1:  Weeks 0-2    GOALS:  Protect healing soft tissue structures  Educate patient on rehab progression  Decrease inflammation swelling  Control pain  Normalized knee range of motion  Restore normalized gait to level surfaces with precautions  Restore full patellar mobility  Increased lower extremity strength in muscle re-education    EXERCISES:  Quad sets  Patellar mobs in all 4 quadrants  Heel slides, AAROM prep knee flexion, seated flexion stretch  Passive knee extension  Hip SLR in 4 planes   Hamstring and gastrocnemius stretch   Aquatic therapy after suture removal  Stationary bike for range of motion/strength  Closed kinetic chain quad strengthening (wall sits, sit-ups, mini squats, leg press, lunges, single leg squats)  Initiate proprioceptive exercises (straight leg balance, ball toss, balance beam, BOSU, Airex)  CRITERA TO ADVANCE TO PHASE II:  Knee ROM: 0-90?; AKHE  Perform SLR without quad lag  Normalized gait per precautions  Normal patellar mobility  Minimal swelling / inflammation  PHASE 2:  Weeks 2-4    GOALS:  Eliminate inflammation swelling  Full knee range of motion (0-135)  Normal gait on all surfaces without assistive device  Improve lower extremity strength  Demonstrates stability with dynamic knee activities (no varus/valgus deviations)  EXERCISES:  Advance ROM/flexibility  Progress hip, quad, hamstring, quad strengthening  Cross training machines for conditioning  Advance lower extremity flexibility  Advance aquatic  exercises    CRITERIA TO ADVANCE TO PHASE III:    Full knee ROM, including AKHE  Demonstrates good quad strength with exercises  Normal gait on all surfaces at community level distance is  Minimal swelling/inflation  No pain with exercises  Physician clearance to initiate return to running and functional progression    PHASE 3:  Weeks 4-12     GOALS:  Increase strength to >85% non involved extremity  Advance proprioception exercises  Improve aerobic endurance  Initiate plyometric exercises    EXERCISES:  Spin bike  Cybex training  Pre running exercises (low skips, punch steps, double punch steps, danny walks, high skips, kick backs, step overs)  Advance proprioception exercises (BOSU, single leg dynamic balance, dual task balance)  Agility drills (ladder, side shoulder poles, crossovers, backwards run, quick start/stop, zig-zag, cutting)  Jump training (shoulder training, trampoline, landing technique, box jumps, single leg hops, tuck jumps)  Return to running-treadmill, with transition to level out door surfaces  Continue strengthening-advanced resistance and repetitions (ball hamstring curls, single leg press, core stabilization)    CRITERIA TO ADVANCE TO PHASE IV:  Lower extremity strength greater than or equal to 85% of non involved by Cybex test  Single leg hop test greater or equal to 85% of noninvolved  No pain with forward running, agilities, jump training, or strengthening  Good knee control with single leg dynamic proprioceptive activities                 PHASE 4:  Weeks 12+  GOALS:  Full return to sport activity  Equal bilateral lower extremity strength  Equal bilateral balance, proprioception, power to lower extremity  100% global functioning radiating  EXERCISES:  Advance above exercises  Gradually increase level of participation in sports specific activities  Running on all surfaces

## 2024-05-03 ENCOUNTER — TELEPHONE (OUTPATIENT)
Dept: OBGYN CLINIC | Facility: HOSPITAL | Age: 44
End: 2024-05-03

## 2024-05-03 NOTE — TELEPHONE ENCOUNTER
Caller: [facility]- Nurse  Josh Sales    Doctor: Armond Bourne    Reason for call: Work status needs to be changed that patient can continue working from home. Please fax to below number.     Call back#: [phone]    Fax# [phone]

## 2024-05-19 DIAGNOSIS — Z98.890 S/P RIGHT KNEE ARTHROSCOPY: ICD-10-CM

## 2024-05-20 RX ORDER — APIXABAN 5 MG/1
5 TABLET, FILM COATED ORAL 2 TIMES DAILY
Qty: 180 TABLET | Refills: 1 | Status: SHIPPED | OUTPATIENT
Start: 2024-05-20

## 2024-05-29 ENCOUNTER — TELEPHONE (OUTPATIENT)
Dept: OBGYN CLINIC | Facility: HOSPITAL | Age: 44
End: 2024-05-29

## 2024-05-29 NOTE — TELEPHONE ENCOUNTER
Caller: Gladys Dykes [facility]    Doctor: Steffen    Reason for call: Gladys needs an updated PT script for patient faxed to [phone]. Thank you    Call back#: n/a

## 2024-06-03 ENCOUNTER — OFFICE VISIT (OUTPATIENT)
Dept: OBGYN CLINIC | Facility: CLINIC | Age: 44
End: 2024-06-03

## 2024-06-03 VITALS
BODY MASS INDEX: 32.88 KG/M2 | WEIGHT: 270 LBS | DIASTOLIC BLOOD PRESSURE: 109 MMHG | HEIGHT: 76 IN | SYSTOLIC BLOOD PRESSURE: 169 MMHG | HEART RATE: 73 BPM

## 2024-06-03 DIAGNOSIS — Z98.890 S/P RIGHT KNEE ARTHROSCOPY: Primary | ICD-10-CM

## 2024-06-03 DIAGNOSIS — S83.281A TEAR OF LATERAL MENISCUS OF RIGHT KNEE, CURRENT, UNSPECIFIED TEAR TYPE, INITIAL ENCOUNTER: ICD-10-CM

## 2024-06-03 PROCEDURE — 99024 POSTOP FOLLOW-UP VISIT: CPT | Performed by: STUDENT IN AN ORGANIZED HEALTH CARE EDUCATION/TRAINING PROGRAM

## 2024-06-03 NOTE — PROGRESS NOTES
Knee Post Operative Visit     Assesment:     44 y.o. male 6 weeks s/p surgical arthroscopy of the right knee with partial lateral meniscectomy and medial chondroplasty, DOS: 4/22/2024     Plan:  The patient's diagnosis and treatment were discussed at length today. We discussed no treatment, non-operative treatment, and operative treatment.    Amandeep presents today 6 weeks status post right knee arthroscopy with partial lateral meniscectomy and medial chondroplasty performed on 4/22/2024.  During today's examination he does demonstrate stiffness and I would like him to continue outpatient PT.  He did mention some frustration with his current therapy office and I did provide him with a new referral to outpatient physical therapy to discuss with his Worker's Comp.  on changing locations.  I also provided him with a work note stating that he is able to return to work as of Monday, 6/10/2024 with limitations of ground-level work only, no ladder use and no roof top duty since he feels unstable on his feet, no squatting greater than 90 degrees, and avoid twisting.  These restrictions are to be in place for 6 weeks time.  I did discuss with him as well that some of this dull achy pain and discomfort that he is exhibiting can take some more time to continue to improve and resolve.  He can continue use ice and over-the-counter medications needed for pain relief.  He is to follow-up in approximately 6 weeks for reevaluation.      Post-Operative treatment:    Ice to knee for 20 minutes at least 1-2 times daily.  OTC NSAIDS prn for pain.  New referral to outpatient physical therapy provided at today's visit.  Work note provided at today's visit.    Imaging:    All imaging from today was reviewed by myself and explained to the patient.     Weight bearing:  as tolerated     ROM:  Full    Brace:  No brace needed    DVT Prophylaxis:  Ambulation    Follow up:   6 weeks    Patient was advised that if they have any fevers,  "chills, chest pain, shortness of breath, redness or drainage from the incision, please let our office know immediately.          Chief Complaint   Patient presents with    Right Knee - Post-op      feeling very unsteady        History of Present Illness:    The patient is a 44 y.o. male who is being evaluated post operatively 6 weeks  status post right knee arthroscopy with partial lateral meniscectomy and medial chondroplasty performed on 4/22/2024.  He states that he continues to exhibit posterior and lateral knee pain which she describes as dull and achy and rates a 4-10.  He states that he has been compliant with outpatient physical therapy, however he is slightly frustrated with the progression that he is making.  He has been compliant with his work restrictions and continues to remain working from home.  He mentions that the swelling and bruising diffusely throughout his leg has resolved and he has since discontinued the Eliquis.  He does also occasionally report some shakiness and weakness within his leg as well as stiffness with prolonged sitting.  He denies any new injury or trauma to his knee.  He denies any numbness or tingling.         I have reviewed the past medical, surgical, social and family history, medications and allergies as documented in the EMR.    Review of systems: ROS is negative other than that noted in the HPI.  Constitutional: Negative for fatigue and fever.        Physical Exam:    Blood pressure (!) 169/109, pulse 73, height 6' 4\" (1.93 m), weight 122 kg (270 lb).    General/Constitutional: NAD, well developed, well nourished  HENT: Normocephalic, atraumatic  CV: Intact distal pulses, regular rate  Resp: No respiratory distress or labored breathing  Lymphatic: No lymphadenopathy palpated  Neuro: Alert and Oriented x 3, no focal deficits  Psych: Normal mood, normal affect, normal judgement, normal behavior  Skin: Warm, dry, no rashes, no erythema      Knee Exam (focused):    Visual " inspection of the right knee demonstrates normal contour without atrophy.   Healed previous incisions.  Resolved diffuse ecchymosis.  No significant joint effusion.  Range of motion is limited 0 to 100 degrees of knee flexion.  Patella tracks equally 2 quadrants bilaterally.  Medial and lateral joint line discomfort.  Same flexion compartment soft and compressible and capillary refill is brisk.  Motor and sensory function intact.      Examination of the patient's ipsilateral hip demonstrates full painless range of motion.  No crepitus.     LE NV Exam: +2 DP/PT pulses bilaterally  Sensation intact to light touch L2-S1 bilaterally     Bilateral hip ROM demonstrates no pain actively or passively    No calf tenderness to palpation bilaterally    Scribe Attestation      I,:  Tre Aleman am acting as a scribe while in the presence of the attending physician.:       I,:  Armond Bourne, DO personally performed the services described in this documentation    as scribed in my presence.:

## 2024-06-03 NOTE — LETTER
Lidia 3, 2024     Patient: Amandeep Emanuel  YOB: 1980  Date of Visit: 6/3/2024      To Whom it May Concern:    Amandeep Emanuel is under my professional care. Amandeep was seen in my office on 6/3/2024. Amandeep may return to work with limitations ground level activity only, no ladder use, 50lb lifting restriction, no deep squatting greater than 90 degrees, and avoid twisting. These are to be in place starting 6/10/2024. These restrictions are to be in placed for 6 weeks which is the time of his next follow up visit .If these limitations are not applicable he is able to continue to work from home until his next follow up.    If you have any questions or concerns, please don't hesitate to call.         Sincerely,          Armond Bourne,         CC: No Recipients

## 2024-07-17 ENCOUNTER — OFFICE VISIT (OUTPATIENT)
Dept: OBGYN CLINIC | Facility: CLINIC | Age: 44
End: 2024-07-17

## 2024-07-17 VITALS — BODY MASS INDEX: 32.03 KG/M2 | HEIGHT: 76 IN | WEIGHT: 263 LBS

## 2024-07-17 DIAGNOSIS — Z98.890 S/P RIGHT KNEE ARTHROSCOPY: Primary | ICD-10-CM

## 2024-07-17 PROCEDURE — 99024 POSTOP FOLLOW-UP VISIT: CPT | Performed by: STUDENT IN AN ORGANIZED HEALTH CARE EDUCATION/TRAINING PROGRAM

## 2024-07-17 NOTE — PROGRESS NOTES
Knee Post Operative Visit     Assesment:     44 y.o. male 3 months status post right knee arthroscopy with partial lateral meniscectomy and medial chondroplasty performed on 4/22/2024    Plan:  The patient's diagnosis and treatment were discussed at length today. We discussed no treatment, non-operative treatment, and operative treatment.    Amandeep presents today 3 months status post right knee arthroscopy with partial lateral meniscectomy and medial chondroplasty performed on 4/22/2024.  He is overall doing extremely well at this time.  I discussed with him that he is able to return to all activities of daily living and work without any restrictions.  He can continue with his home exercise program as tolerated.  He can continue ice and over-the-counter medications needed for pain relief.  He can follow-up me on an as-needed basis.    Post-Operative treatment:    Ice to knee for 20 minutes at least 1-2 times daily.  OTC NSAIDS prn for pain.  Let pain guide gradual return activities.    Imaging:    No imaging was available for review today.    Weight bearing:  as tolerated     ROM:  Full    Brace:  No brace needed    DVT Prophylaxis:  Ambulation    Follow up:   As needed    Patient was advised that if they have any fevers, chills, chest pain, shortness of breath, redness or drainage from the incision, please let our office know immediately.          Chief Complaint   Patient presents with    Right Knee - Post-op       SX: 4/22/24       History of Present Illness:    The patient is a 44 y.o. male who is being evaluated post operatively 3 months   status post right knee arthroscopy with partial lateral meniscectomy and medial chondroplasty performed on 4/22/2024.  He states that he is overall doing well at this time and denies any pain or discomfort.  He states that he has discontinued outpatient physical therapy and transition to home exercise program.  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  He denies any issues or complications since returning to work and all activities.  He denies any new injury or trauma to his knee.  He denies any numbness or tingling.       I have reviewed the past medical, surgical, social and family history, medications and allergies as documented in the EMR.    Review of systems: ROS is negative other than that noted in the HPI.  Constitutional: Negative for fatigue and fever.        Physical Exam:    Height 6' 4\" (1.93 m), weight 119 kg (263 lb).    General/Constitutional: NAD, well developed, well nourished  HENT: Normocephalic, atraumatic  CV: Intact distal pulses, regular rate  Resp: No respiratory distress or labored breathing  Lymphatic: No lymphadenopathy palpated  Neuro: Alert and Oriented x 3, no focal deficits  Psych: Normal mood, normal affect, normal judgement, normal behavior  Skin: Warm, dry, no rashes, no erythema      Knee Exam (focused):    Visual inspection of the Right knee demonstrates normal contour without atrophy.   Healed previous incisions   There is no significant erythema or edema.    No significant joint effusion   Range of motion is full from 0-130 degrees of flexion   Able to straight leg raise   No patellar or incisional tender to palpation  - medial joint line tenderness, - lateral joint line tenderness  - medial Aramis's, - lateral Aramis's  1A Lachman exam, Stable posterior drawer  Stable to varus and valgus stress at both 0 and 30°  Patella tracks normally.  No J sign.  No apprehension.  Translation is approximately 2 quadrants and is equal to the contralateral side  Patellar eversion is similar to the contralateral side    Examination of the patient's ipsilateral hip demonstrates full painless range of motion.  No crepitus.     LE NV Exam: +2 DP/PT pulses bilaterally  Sensation intact to light touch L2-S1 bilaterally     Bilateral hip ROM demonstrates no pain actively or passively    No " calf tenderness to palpation bilaterally    Scribe Attestation      I,:  Tre Aleman am acting as a scribe while in the presence of the attending physician.:       I,:  Armond Bourne, DO personally performed the services described in this documentation    as scribed in my presence.:

## 2024-07-22 ENCOUNTER — TELEPHONE (OUTPATIENT)
Age: 44
End: 2024-07-22

## 2024-07-22 NOTE — TELEPHONE ENCOUNTER
Caller: Celi poe [facility]    Doctor: Steffen    Reason for call:     //Faxed office notes from , faxed to [phone], completed    Call back#: n/a

## (undated) DEVICE — IMPERVIOUS STOCKINETTE: Brand: DEROYAL

## (undated) DEVICE — DECANTER: Brand: UNBRANDED

## (undated) DEVICE — TUBING SUCTION 5MM X 12 FT

## (undated) DEVICE — STIRRUP STRAP ADULT DISP

## (undated) DEVICE — BETHLEHEM UNIVERSAL  ARTHRO PK: Brand: CARDINAL HEALTH

## (undated) DEVICE — LIGHT GLOVE GREEN

## (undated) DEVICE — 3M™ STERI-STRIP™ REINFORCED ADHESIVE SKIN CLOSURES, R1547, 1/2 IN X 4 IN (12 MM X 100 MM), 6 STRIPS/ENVELOPE: Brand: 3M™ STERI-STRIP™

## (undated) DEVICE — GLOVE INDICATOR PI UNDERGLOVE SZ 8 BLUE

## (undated) DEVICE — NEEDLE 18 G X 1 1/2 SAFETY

## (undated) DEVICE — PAD CAST 4 IN COTTON NON STERILE

## (undated) DEVICE — SUT MONOCRYL 3-0 PS-2 18 IN Y497G

## (undated) DEVICE — TIBURON EXTREMITY SHEET: Brand: CONVERTORS

## (undated) DEVICE — INTENDED FOR TISSUE SEPARATION, AND OTHER PROCEDURES THAT REQUIRE A SHARP SURGICAL BLADE TO PUNCTURE OR CUT.: Brand: BARD-PARKER ® CARBON RIB-BACK BLADES

## (undated) DEVICE — GAUZE SPONGES,16 PLY: Brand: CURITY

## (undated) DEVICE — ACE WRAP 6 IN UNSTERILE

## (undated) DEVICE — PADDING CAST 4 IN  COTTON STRL

## (undated) DEVICE — TUBING ARTHROSCOPY REDUCE PATIENT

## (undated) DEVICE — GLOVE SRG BIOGEL ORTHOPEDIC 8

## (undated) DEVICE — CUFF TOURNIQUET 30 X 4 IN QUICK CONNECT DISP 1BLA

## (undated) DEVICE — 3M™ IOBAN™ 2 ANTIMICROBIAL INCISE DRAPE 6650EZ: Brand: IOBAN™ 2

## (undated) DEVICE — SYRINGE 30ML LL

## (undated) DEVICE — DRAPE SHEET THREE QUARTER

## (undated) DEVICE — CHLORAPREP HI-LITE 26ML ORANGE

## (undated) DEVICE — ABDOMINAL PAD: Brand: DERMACEA

## (undated) DEVICE — BLADE SHAVER DISSECTOR 4MM 13CM COOLCUT